# Patient Record
Sex: MALE | Race: WHITE | NOT HISPANIC OR LATINO | Employment: OTHER | URBAN - METROPOLITAN AREA
[De-identification: names, ages, dates, MRNs, and addresses within clinical notes are randomized per-mention and may not be internally consistent; named-entity substitution may affect disease eponyms.]

---

## 2023-05-05 PROBLEM — J44.9 COPD, MODERATE (MULTI): Status: ACTIVE | Noted: 2023-05-05

## 2023-05-05 PROBLEM — K21.9 GERD (GASTROESOPHAGEAL REFLUX DISEASE): Status: ACTIVE | Noted: 2023-05-05

## 2023-05-05 PROBLEM — E16.2 HYPOGLYCEMIA: Status: ACTIVE | Noted: 2023-05-05

## 2023-05-05 PROBLEM — N40.0 HYPERTROPHY OF PROSTATE: Status: ACTIVE | Noted: 2023-05-05

## 2023-05-05 PROBLEM — E83.42 HYPOMAGNESEMIA: Status: ACTIVE | Noted: 2023-05-05

## 2023-05-05 PROBLEM — R73.03 PREDIABETES: Status: ACTIVE | Noted: 2023-05-05

## 2023-05-05 PROBLEM — F17.200 TOBACCO USE DISORDER: Status: ACTIVE | Noted: 2023-05-05

## 2023-05-05 PROBLEM — M15.9 PRIMARY OSTEOARTHRITIS INVOLVING MULTIPLE JOINTS: Status: ACTIVE | Noted: 2023-05-05

## 2023-05-05 PROBLEM — D53.9 ANEMIA, DEFICIENCY: Status: ACTIVE | Noted: 2023-05-05

## 2023-05-05 PROBLEM — I10 ESSENTIAL HYPERTENSION: Status: ACTIVE | Noted: 2023-05-05

## 2023-05-05 PROBLEM — R73.9 HYPERGLYCEMIA: Status: ACTIVE | Noted: 2023-05-05

## 2023-05-05 PROBLEM — M15.0 PRIMARY OSTEOARTHRITIS INVOLVING MULTIPLE JOINTS: Status: ACTIVE | Noted: 2023-05-05

## 2023-05-05 RX ORDER — LANOLIN ALCOHOL/MO/W.PET/CERES
1 CREAM (GRAM) TOPICAL DAILY
COMMUNITY
Start: 2020-03-19 | End: 2023-06-01 | Stop reason: SDUPTHER

## 2023-05-05 RX ORDER — PANTOPRAZOLE SODIUM 40 MG/1
1 TABLET, DELAYED RELEASE ORAL DAILY
COMMUNITY
End: 2023-06-01 | Stop reason: SDUPTHER

## 2023-05-05 RX ORDER — AMLODIPINE BESYLATE 5 MG/1
1 TABLET ORAL DAILY
COMMUNITY
End: 2023-06-01 | Stop reason: SDUPTHER

## 2023-05-05 RX ORDER — ALBUTEROL SULFATE 0.83 MG/ML
SOLUTION RESPIRATORY (INHALATION)
COMMUNITY
Start: 2020-12-15 | End: 2023-12-05 | Stop reason: SDUPTHER

## 2023-05-05 RX ORDER — GLUC/MSM/COLGN2/HYAL/ANTIARTH3 375-375-20
1 TABLET ORAL EVERY OTHER DAY
COMMUNITY

## 2023-05-05 RX ORDER — ALBUTEROL SULFATE 90 UG/1
AEROSOL, METERED RESPIRATORY (INHALATION)
COMMUNITY
Start: 2019-11-04 | End: 2023-08-10

## 2023-05-05 RX ORDER — TAMSULOSIN HYDROCHLORIDE 0.4 MG/1
CAPSULE ORAL
COMMUNITY
End: 2023-06-01 | Stop reason: SDUPTHER

## 2023-05-22 LAB
ALANINE AMINOTRANSFERASE (SGPT) (U/L) IN SER/PLAS: 8 U/L (ref 10–52)
ALBUMIN (G/DL) IN SER/PLAS: 4.3 G/DL (ref 3.4–5)
ALKALINE PHOSPHATASE (U/L) IN SER/PLAS: 70 U/L (ref 33–136)
ANION GAP IN SER/PLAS: 12 MMOL/L (ref 10–20)
ASPARTATE AMINOTRANSFERASE (SGOT) (U/L) IN SER/PLAS: 17 U/L (ref 9–39)
BILIRUBIN TOTAL (MG/DL) IN SER/PLAS: 0.6 MG/DL (ref 0–1.2)
CALCIUM (MG/DL) IN SER/PLAS: 9.4 MG/DL (ref 8.6–10.3)
CARBON DIOXIDE, TOTAL (MMOL/L) IN SER/PLAS: 31 MMOL/L (ref 21–32)
CHLORIDE (MMOL/L) IN SER/PLAS: 100 MMOL/L (ref 98–107)
COBALAMIN (VITAMIN B12) (PG/ML) IN SER/PLAS: 322 PG/ML (ref 211–911)
CREATININE (MG/DL) IN SER/PLAS: 0.79 MG/DL (ref 0.5–1.3)
ERYTHROCYTE DISTRIBUTION WIDTH (RATIO) BY AUTOMATED COUNT: 14.1 % (ref 11.5–14.5)
ERYTHROCYTE MEAN CORPUSCULAR HEMOGLOBIN CONCENTRATION (G/DL) BY AUTOMATED: 32.1 G/DL (ref 32–36)
ERYTHROCYTE MEAN CORPUSCULAR VOLUME (FL) BY AUTOMATED COUNT: 99 FL (ref 80–100)
ERYTHROCYTES (10*6/UL) IN BLOOD BY AUTOMATED COUNT: 4.36 X10E12/L (ref 4.5–5.9)
GFR MALE: >90 ML/MIN/1.73M2
GLUCOSE (MG/DL) IN SER/PLAS: 105 MG/DL (ref 74–99)
HEMATOCRIT (%) IN BLOOD BY AUTOMATED COUNT: 43.3 % (ref 41–52)
HEMOGLOBIN (G/DL) IN BLOOD: 13.9 G/DL (ref 13.5–17.5)
IRON (UG/DL) IN SER/PLAS: 164 UG/DL (ref 35–150)
LEUKOCYTES (10*3/UL) IN BLOOD BY AUTOMATED COUNT: 8.1 X10E9/L (ref 4.4–11.3)
PLATELETS (10*3/UL) IN BLOOD AUTOMATED COUNT: 302 X10E9/L (ref 150–450)
POTASSIUM (MMOL/L) IN SER/PLAS: 4.2 MMOL/L (ref 3.5–5.3)
PROSTATE SPECIFIC ANTIGEN,SCREEN: 1.4 NG/ML (ref 0–4)
PROTEIN TOTAL: 7.1 G/DL (ref 6.4–8.2)
SODIUM (MMOL/L) IN SER/PLAS: 139 MMOL/L (ref 136–145)
UREA NITROGEN (MG/DL) IN SER/PLAS: 14 MG/DL (ref 6–23)

## 2023-06-01 ENCOUNTER — OFFICE VISIT (OUTPATIENT)
Dept: PRIMARY CARE | Facility: CLINIC | Age: 73
End: 2023-06-01
Payer: MEDICARE

## 2023-06-01 VITALS
RESPIRATION RATE: 16 BRPM | HEART RATE: 92 BPM | TEMPERATURE: 97.5 F | HEIGHT: 72 IN | SYSTOLIC BLOOD PRESSURE: 122 MMHG | DIASTOLIC BLOOD PRESSURE: 82 MMHG | OXYGEN SATURATION: 92 % | BODY MASS INDEX: 17.2 KG/M2 | WEIGHT: 127 LBS

## 2023-06-01 DIAGNOSIS — Z00.00 ROUTINE GENERAL MEDICAL EXAMINATION AT HEALTH CARE FACILITY: Primary | ICD-10-CM

## 2023-06-01 DIAGNOSIS — I10 ESSENTIAL HYPERTENSION: ICD-10-CM

## 2023-06-01 DIAGNOSIS — E83.42 HYPOMAGNESEMIA: ICD-10-CM

## 2023-06-01 DIAGNOSIS — N40.0 HYPERTROPHY OF PROSTATE: ICD-10-CM

## 2023-06-01 DIAGNOSIS — E46 PROTEIN-CALORIE MALNUTRITION, UNSPECIFIED SEVERITY (MULTI): ICD-10-CM

## 2023-06-01 DIAGNOSIS — J44.9 COPD, MODERATE (MULTI): ICD-10-CM

## 2023-06-01 DIAGNOSIS — D53.9 ANEMIA, DEFICIENCY: ICD-10-CM

## 2023-06-01 DIAGNOSIS — K21.9 GASTROESOPHAGEAL REFLUX DISEASE WITHOUT ESOPHAGITIS: ICD-10-CM

## 2023-06-01 PROCEDURE — 99213 OFFICE O/P EST LOW 20 MIN: CPT | Performed by: FAMILY MEDICINE

## 2023-06-01 PROCEDURE — G0439 PPPS, SUBSEQ VISIT: HCPCS | Performed by: FAMILY MEDICINE

## 2023-06-01 PROCEDURE — 1159F MED LIST DOCD IN RCRD: CPT | Performed by: FAMILY MEDICINE

## 2023-06-01 PROCEDURE — 3074F SYST BP LT 130 MM HG: CPT | Performed by: FAMILY MEDICINE

## 2023-06-01 PROCEDURE — 1170F FXNL STATUS ASSESSED: CPT | Performed by: FAMILY MEDICINE

## 2023-06-01 PROCEDURE — 1160F RVW MEDS BY RX/DR IN RCRD: CPT | Performed by: FAMILY MEDICINE

## 2023-06-01 PROCEDURE — 3079F DIAST BP 80-89 MM HG: CPT | Performed by: FAMILY MEDICINE

## 2023-06-01 RX ORDER — AMLODIPINE BESYLATE 5 MG/1
5 TABLET ORAL DAILY
Qty: 90 TABLET | Refills: 3 | Status: SHIPPED | OUTPATIENT
Start: 2023-06-01 | End: 2023-12-05 | Stop reason: SDUPTHER

## 2023-06-01 RX ORDER — LANOLIN ALCOHOL/MO/W.PET/CERES
1 CREAM (GRAM) TOPICAL DAILY
Qty: 90 TABLET | Refills: 3 | Status: SHIPPED | OUTPATIENT
Start: 2023-06-01 | End: 2024-05-31

## 2023-06-01 RX ORDER — TAMSULOSIN HYDROCHLORIDE 0.4 MG/1
0.4 CAPSULE ORAL DAILY
Qty: 90 CAPSULE | Refills: 3 | Status: SHIPPED | OUTPATIENT
Start: 2023-06-01 | End: 2023-12-05 | Stop reason: SDUPTHER

## 2023-06-01 RX ORDER — PANTOPRAZOLE SODIUM 40 MG/1
40 TABLET, DELAYED RELEASE ORAL DAILY
Qty: 90 TABLET | Refills: 3 | Status: SHIPPED | OUTPATIENT
Start: 2023-06-01 | End: 2023-12-05 | Stop reason: SDUPTHER

## 2023-06-01 RX ORDER — CYPROHEPTADINE HYDROCHLORIDE 4 MG/1
4 TABLET ORAL 2 TIMES DAILY PRN
COMMUNITY
Start: 2023-05-25 | End: 2023-12-05 | Stop reason: WASHOUT

## 2023-06-01 SDOH — ECONOMIC STABILITY: FOOD INSECURITY: WITHIN THE PAST 12 MONTHS, THE FOOD YOU BOUGHT JUST DIDN'T LAST AND YOU DIDN'T HAVE MONEY TO GET MORE.: NEVER TRUE

## 2023-06-01 SDOH — ECONOMIC STABILITY: FOOD INSECURITY: WITHIN THE PAST 12 MONTHS, YOU WORRIED THAT YOUR FOOD WOULD RUN OUT BEFORE YOU GOT MONEY TO BUY MORE.: NEVER TRUE

## 2023-06-01 ASSESSMENT — ACTIVITIES OF DAILY LIVING (ADL)
DOING_HOUSEWORK: INDEPENDENT
TAKING_MEDICATION: INDEPENDENT
BATHING: INDEPENDENT
MANAGING_FINANCES: INDEPENDENT
GROCERY_SHOPPING: INDEPENDENT
DRESSING: INDEPENDENT

## 2023-06-01 ASSESSMENT — PATIENT HEALTH QUESTIONNAIRE - PHQ9
SUM OF ALL RESPONSES TO PHQ9 QUESTIONS 1 & 2: 0
2. FEELING DOWN, DEPRESSED OR HOPELESS: NOT AT ALL
1. LITTLE INTEREST OR PLEASURE IN DOING THINGS: NOT AT ALL

## 2023-06-01 ASSESSMENT — ENCOUNTER SYMPTOMS
OCCASIONAL FEELINGS OF UNSTEADINESS: 0
LOSS OF SENSATION IN FEET: 0
DEPRESSION: 0

## 2023-06-01 ASSESSMENT — LIFESTYLE VARIABLES
SKIP TO QUESTIONS 9-10: 1
AUDIT-C TOTAL SCORE: 4
HOW OFTEN DO YOU HAVE SIX OR MORE DRINKS ON ONE OCCASION: NEVER
HOW OFTEN DO YOU HAVE A DRINK CONTAINING ALCOHOL: 4 OR MORE TIMES A WEEK
HOW MANY STANDARD DRINKS CONTAINING ALCOHOL DO YOU HAVE ON A TYPICAL DAY: 1 OR 2

## 2023-06-01 NOTE — PROGRESS NOTES
Pt independent from home with spouse (who is an RN). Will need PICC /IV zosyn at discharge. [facility] referral/accepted; pt in agreement. [place] [id number] orders ( For pharmacy only, one time visit)on chart. Will need IV atb RX faxed to [facility] at [phone]. await final ID plan. He Duque. Addendum; per leticia at [facility]; pt's wife is an employee of [employer]; [facility] out of network; must refer to [facility] (DONE) start of care can begin at discharge per anna at [place] [id number]. . Will follow. Pt aware. He Duque. Subjective   Patient ID: Carlito Vail is a 72 y.o. male who presents for Medicare Annual Wellness Visit Subsequent.  At his last visit, I started him on cyproheptadine to try to help increase his appetite and help with allergies. He has noticed an improvement in his appetite and is up 20 lbs in past 6 months. He does not feel that it is helping with his allergy symptoms but isn't too concerned about it.         Current Outpatient Medications   Medication Sig Dispense Refill    albuterol 2.5 mg /3 mL (0.083 %) nebulizer solution Inhale.      albuterol 90 mcg/actuation inhaler Inhale.      cyproheptadine (Periactin) 4 mg tablet Take 1 tablet (4 mg) by mouth 2 times a day as needed for allergies.      ferrous gluconate 236 mg (27 mg iron) tablet Take 1 tablet (27 mg) by mouth every other day.      amLODIPine (Norvasc) 5 mg tablet Take 1 tablet (5 mg) by mouth once daily. 90 tablet 3    magnesium oxide (Mag-Ox) 400 mg (241.3 mg magnesium) tablet Take 1 tablet (400 mg) by mouth once daily. 90 tablet 3    pantoprazole (ProtoNix) 40 mg EC tablet Take 1 tablet (40 mg) by mouth once daily. 90 tablet 3    tamsulosin (Flomax) 0.4 mg 24 hr capsule Take 1 capsule (0.4 mg) by mouth once daily. 90 capsule 3     No current facility-administered medications for this visit.       Objective     Visit Vitals  /82 (BP Location: Left arm, Patient Position: Sitting, BP Cuff Size: Adult)   Pulse 92   Temp 36.4 °C (97.5 °F)   Resp 16   Ht 1.829 m (6')   Wt 57.6 kg (127 lb)   SpO2 92%   BMI 17.22 kg/m²   Smoking Status Every Day   BSA 1.71 m²        Constitutional: thin, well developed, appears stated age  Eyes: no scleral icterus, no conjunctival injection  Ears: normal external auditory canal, no retraction or bulging of tympanic membranes  Neck: no thyromegaly  Cardiovascular: regular rate and rhythm, no leg edema  Respiratory: normal respiratory effort, clear to auscultation bilaterally  Musculoskeletal: No gross deformities  appreciated  Skin: Warm, dry. No rashes  Neurologic: Alert, CNs II-XII grossly intact..  Psych: Appropriate mood and affect.        Assessment/Plan   Problem List Items Addressed This Visit          Respiratory    COPD, moderate (CMS/HCC)     Stable on albuterol prn            Circulatory    Essential hypertension     Controlled on amlodipine          Relevant Medications    amLODIPine (Norvasc) 5 mg tablet    Other Relevant Orders    Comprehensive metabolic panel       Digestive    GERD (gastroesophageal reflux disease)    Relevant Medications    pantoprazole (ProtoNix) 40 mg EC tablet       Genitourinary    Hypertrophy of prostate    Relevant Medications    tamsulosin (Flomax) 0.4 mg 24 hr capsule       Endocrine/Metabolic    Protein-calorie malnutrition, unspecified severity (CMS/HCC)     Weight has improved by 20 lbs in past 6 months since starting cyproheptadine, continue            Hematologic    Anemia, deficiency     Iron level is now normal (actually slightly high)  Decrease ferrous gluconate to every other day         Relevant Orders    CBC    Iron level       Other    Hypomagnesemia    Relevant Medications    magnesium oxide (Mag-Ox) 400 mg (241.3 mg magnesium) tablet     Other Visit Diagnoses       Routine general medical examination at health care facility    -  Primary    Colonoscopy 5/17/2021              All pertinent lab work and results were reviewed with patient.     Follow up 6 month     Kirsten Jay DO

## 2023-08-10 DIAGNOSIS — J44.9 COPD, MODERATE (MULTI): Primary | ICD-10-CM

## 2023-08-10 RX ORDER — ALBUTEROL SULFATE 90 UG/1
2 AEROSOL, METERED RESPIRATORY (INHALATION) EVERY 4 HOURS PRN
Qty: 18 G | Refills: 11 | Status: SHIPPED | OUTPATIENT
Start: 2023-08-10 | End: 2023-12-05 | Stop reason: SDUPTHER

## 2023-08-21 ENCOUNTER — HOSPITAL ENCOUNTER (OUTPATIENT)
Dept: DATA CONVERSION | Facility: HOSPITAL | Age: 73
End: 2023-08-21
Attending: SURGERY | Admitting: SURGERY
Payer: MEDICARE

## 2023-08-21 DIAGNOSIS — Z80.0 FAMILY HISTORY OF MALIGNANT NEOPLASM OF DIGESTIVE ORGANS: ICD-10-CM

## 2023-08-21 DIAGNOSIS — Q43.8 OTHER SPECIFIED CONGENITAL MALFORMATIONS OF INTESTINE: ICD-10-CM

## 2023-08-21 DIAGNOSIS — Z12.11 ENCOUNTER FOR SCREENING FOR MALIGNANT NEOPLASM OF COLON: ICD-10-CM

## 2023-08-21 DIAGNOSIS — Z86.010 PERSONAL HISTORY OF COLONIC POLYPS: ICD-10-CM

## 2023-09-29 VITALS — BODY MASS INDEX: 15.1 KG/M2 | WEIGHT: 111.33 LBS

## 2023-11-21 ENCOUNTER — LAB (OUTPATIENT)
Dept: LAB | Facility: LAB | Age: 73
End: 2023-11-21
Payer: MEDICARE

## 2023-11-21 DIAGNOSIS — D53.9 ANEMIA, DEFICIENCY: ICD-10-CM

## 2023-11-21 DIAGNOSIS — I10 ESSENTIAL HYPERTENSION: ICD-10-CM

## 2023-11-21 LAB
ALBUMIN SERPL BCP-MCNC: 4.2 G/DL (ref 3.4–5)
ALP SERPL-CCNC: 81 U/L (ref 33–136)
ALT SERPL W P-5'-P-CCNC: 7 U/L (ref 10–52)
ANION GAP SERPL CALC-SCNC: 11 MMOL/L (ref 10–20)
AST SERPL W P-5'-P-CCNC: 14 U/L (ref 9–39)
BILIRUB SERPL-MCNC: 0.4 MG/DL (ref 0–1.2)
BUN SERPL-MCNC: 14 MG/DL (ref 6–23)
CALCIUM SERPL-MCNC: 9.1 MG/DL (ref 8.6–10.3)
CHLORIDE SERPL-SCNC: 100 MMOL/L (ref 98–107)
CO2 SERPL-SCNC: 30 MMOL/L (ref 21–32)
CREAT SERPL-MCNC: 0.8 MG/DL (ref 0.5–1.3)
ERYTHROCYTE [DISTWIDTH] IN BLOOD BY AUTOMATED COUNT: 13.3 % (ref 11.5–14.5)
GFR SERPL CREATININE-BSD FRML MDRD: >90 ML/MIN/1.73M*2
GLUCOSE SERPL-MCNC: 149 MG/DL (ref 74–99)
HCT VFR BLD AUTO: 43.2 % (ref 41–52)
HGB BLD-MCNC: 14 G/DL (ref 13.5–17.5)
IRON SERPL-MCNC: 124 UG/DL (ref 35–150)
MCH RBC QN AUTO: 32.6 PG (ref 26–34)
MCHC RBC AUTO-ENTMCNC: 32.4 G/DL (ref 32–36)
MCV RBC AUTO: 101 FL (ref 80–100)
NRBC BLD-RTO: 0 /100 WBCS (ref 0–0)
PLATELET # BLD AUTO: 282 X10*3/UL (ref 150–450)
POTASSIUM SERPL-SCNC: 3.4 MMOL/L (ref 3.5–5.3)
PROT SERPL-MCNC: 6.6 G/DL (ref 6.4–8.2)
RBC # BLD AUTO: 4.29 X10*6/UL (ref 4.5–5.9)
SODIUM SERPL-SCNC: 138 MMOL/L (ref 136–145)
WBC # BLD AUTO: 6.7 X10*3/UL (ref 4.4–11.3)

## 2023-11-21 PROCEDURE — 36415 COLL VENOUS BLD VENIPUNCTURE: CPT

## 2023-11-21 PROCEDURE — 85027 COMPLETE CBC AUTOMATED: CPT

## 2023-11-21 PROCEDURE — 80053 COMPREHEN METABOLIC PANEL: CPT

## 2023-11-21 PROCEDURE — 83540 ASSAY OF IRON: CPT

## 2023-12-04 NOTE — PATIENT INSTRUCTIONS
I ordered blood work for your next visit. You do not need to fast.     Thank you for choosing [facility] for your healthcare.   As always if you have any questions or concerns please do not hesitate to call our office at [phone] or through [employer].    Have a great day!  Kirsten Jay, DO

## 2023-12-05 ENCOUNTER — OFFICE VISIT (OUTPATIENT)
Dept: PRIMARY CARE | Facility: CLINIC | Age: 73
End: 2023-12-05
Payer: MEDICARE

## 2023-12-05 VITALS
SYSTOLIC BLOOD PRESSURE: 137 MMHG | HEART RATE: 68 BPM | OXYGEN SATURATION: 99 % | TEMPERATURE: 97.1 F | BODY MASS INDEX: 15.33 KG/M2 | DIASTOLIC BLOOD PRESSURE: 82 MMHG | WEIGHT: 113 LBS | RESPIRATION RATE: 16 BRPM

## 2023-12-05 DIAGNOSIS — R73.9 HYPERGLYCEMIA: ICD-10-CM

## 2023-12-05 DIAGNOSIS — N40.0 HYPERTROPHY OF PROSTATE: ICD-10-CM

## 2023-12-05 DIAGNOSIS — D53.9 ANEMIA, DEFICIENCY: ICD-10-CM

## 2023-12-05 DIAGNOSIS — I10 ESSENTIAL HYPERTENSION: ICD-10-CM

## 2023-12-05 DIAGNOSIS — K21.9 GASTROESOPHAGEAL REFLUX DISEASE WITHOUT ESOPHAGITIS: ICD-10-CM

## 2023-12-05 DIAGNOSIS — J44.9 COPD, MODERATE (MULTI): Primary | ICD-10-CM

## 2023-12-05 PROCEDURE — 1126F AMNT PAIN NOTED NONE PRSNT: CPT | Performed by: FAMILY MEDICINE

## 2023-12-05 PROCEDURE — 3075F SYST BP GE 130 - 139MM HG: CPT | Performed by: FAMILY MEDICINE

## 2023-12-05 PROCEDURE — 1160F RVW MEDS BY RX/DR IN RCRD: CPT | Performed by: FAMILY MEDICINE

## 2023-12-05 PROCEDURE — 1159F MED LIST DOCD IN RCRD: CPT | Performed by: FAMILY MEDICINE

## 2023-12-05 PROCEDURE — 99214 OFFICE O/P EST MOD 30 MIN: CPT | Performed by: FAMILY MEDICINE

## 2023-12-05 PROCEDURE — 3079F DIAST BP 80-89 MM HG: CPT | Performed by: FAMILY MEDICINE

## 2023-12-05 RX ORDER — FLUTICASONE FUROATE, UMECLIDINIUM BROMIDE AND VILANTEROL TRIFENATATE 100; 62.5; 25 UG/1; UG/1; UG/1
1 POWDER RESPIRATORY (INHALATION) DAILY
Qty: 60 EACH | Refills: 11 | Status: SHIPPED | OUTPATIENT
Start: 2023-12-05

## 2023-12-05 RX ORDER — ALBUTEROL SULFATE 0.83 MG/ML
2.5 SOLUTION RESPIRATORY (INHALATION) EVERY 6 HOURS PRN
Qty: 75 ML | Refills: 11 | Status: SHIPPED | OUTPATIENT
Start: 2023-12-05

## 2023-12-05 RX ORDER — TAMSULOSIN HYDROCHLORIDE 0.4 MG/1
0.4 CAPSULE ORAL DAILY
Qty: 90 CAPSULE | Refills: 3 | Status: SHIPPED | OUTPATIENT
Start: 2023-12-05 | End: 2024-12-04

## 2023-12-05 RX ORDER — ALBUTEROL SULFATE 90 UG/1
2 AEROSOL, METERED RESPIRATORY (INHALATION) EVERY 4 HOURS PRN
Qty: 18 G | Refills: 11 | Status: SHIPPED | OUTPATIENT
Start: 2023-12-05 | End: 2024-06-10

## 2023-12-05 RX ORDER — AMLODIPINE BESYLATE 5 MG/1
5 TABLET ORAL DAILY
Qty: 90 TABLET | Refills: 3 | Status: SHIPPED | OUTPATIENT
Start: 2023-12-05 | End: 2024-12-04

## 2023-12-05 RX ORDER — ALBUTEROL SULFATE 0.83 MG/ML
2.5 SOLUTION RESPIRATORY (INHALATION) EVERY 4 HOURS PRN
Qty: 75 ML | Refills: 1 | Status: CANCELLED | OUTPATIENT
Start: 2023-12-05

## 2023-12-05 RX ORDER — PANTOPRAZOLE SODIUM 40 MG/1
40 TABLET, DELAYED RELEASE ORAL DAILY
Qty: 90 TABLET | Refills: 3 | Status: SHIPPED | OUTPATIENT
Start: 2023-12-05 | End: 2024-06-10

## 2023-12-05 SDOH — ECONOMIC STABILITY: FOOD INSECURITY: WITHIN THE PAST 12 MONTHS, THE FOOD YOU BOUGHT JUST DIDN'T LAST AND YOU DIDN'T HAVE MONEY TO GET MORE.: NEVER TRUE

## 2023-12-05 SDOH — ECONOMIC STABILITY: FOOD INSECURITY: WITHIN THE PAST 12 MONTHS, YOU WORRIED THAT YOUR FOOD WOULD RUN OUT BEFORE YOU GOT MONEY TO BUY MORE.: NEVER TRUE

## 2023-12-05 ASSESSMENT — LIFESTYLE VARIABLES
HOW OFTEN DO YOU HAVE A DRINK CONTAINING ALCOHOL: MONTHLY OR LESS
HOW OFTEN DO YOU HAVE SIX OR MORE DRINKS ON ONE OCCASION: NEVER
HOW MANY STANDARD DRINKS CONTAINING ALCOHOL DO YOU HAVE ON A TYPICAL DAY: 1 OR 2
AUDIT-C TOTAL SCORE: 1
SKIP TO QUESTIONS 9-10: 1

## 2023-12-05 ASSESSMENT — PATIENT HEALTH QUESTIONNAIRE - PHQ9
1. LITTLE INTEREST OR PLEASURE IN DOING THINGS: NOT AT ALL
SUM OF ALL RESPONSES TO PHQ9 QUESTIONS 1 & 2: 0
2. FEELING DOWN, DEPRESSED OR HOPELESS: NOT AT ALL

## 2023-12-05 ASSESSMENT — PAIN SCALES - GENERAL: PAINLEVEL: 0-NO PAIN

## 2023-12-05 NOTE — PROGRESS NOTES
"Subjective   Patient ID: Verner L Blankenship \"Navjot" is a 73 y.o. male who presents for Follow-up, Anemia, Hypertension, and COPD.  HPI    No new problems or concerns. His pharmacist recommended he ask me about maintenance medication for COPD due to he is refilling his albuterol monthly and using it almost daily. He recalls being on Anoro in the past which worked well until it became too expensive.     Current Outpatient Medications   Medication Sig Dispense Refill    ferrous gluconate 236 mg (27 mg iron) tablet Take 1 tablet (27 mg) by mouth every other day.      magnesium oxide (Mag-Ox) 400 mg (241.3 mg magnesium) tablet Take 1 tablet (400 mg) by mouth once daily. 90 tablet 3    albuterol 2.5 mg /3 mL (0.083 %) nebulizer solution Take 3 mL (2.5 mg) by nebulization every 6 hours if needed for wheezing. 75 mL 11    albuterol 90 mcg/actuation inhaler Inhale 2 puffs every 4 hours if needed for shortness of breath. 18 g 11    amLODIPine (Norvasc) 5 mg tablet Take 1 tablet (5 mg) by mouth once daily. 90 tablet 3    fluticasone-umeclidin-vilanter (Trelegy Ellipta) 100-62.5-25 mcg blister with device Inhale 1 puff once daily. 60 each 11    pantoprazole (ProtoNix) 40 mg EC tablet Take 1 tablet (40 mg) by mouth once daily. 90 tablet 3    tamsulosin (Flomax) 0.4 mg 24 hr capsule Take 1 capsule (0.4 mg) by mouth once daily. 90 capsule 3     No current facility-administered medications for this visit.       Objective     Visit Vitals  /82 (BP Location: Left arm, Patient Position: Sitting, BP Cuff Size: Adult)   Pulse 68   Temp 36.2 °C (97.1 °F) (Temporal)   Resp 16   Wt 51.3 kg (113 lb)   SpO2 99%   BMI 15.33 kg/m²   Smoking Status Every Day   BSA 1.61 m²        Constitutional: thin, well developed, appears stated age  Eyes: no scleral icterus, no conjunctival injection  Ears: normal external auditory canal, no retraction or bulging of tympanic membranes  Neck: no thyromegaly  Cardiovascular: regular rate and rhythm, no " leg edema  Respiratory: normal respiratory effort, clear to auscultation bilaterally  Musculoskeletal: No gross deformities appreciated  Skin: Warm, dry. No rashes  Neurologic: Alert, CNs II-XII grossly intact..  Psych: Appropriate mood and affect.        Assessment/Plan   Problem List Items Addressed This Visit       Anemia, deficiency (Chronic)     Continue iron every other day         Relevant Orders    Folate    CBC    Iron level    COPD, moderate (CMS/HCC) - Primary (Chronic)     Using albtuerol often  Will have him try Trelegy         Relevant Medications    albuterol 90 mcg/actuation inhaler    albuterol 2.5 mg /3 mL (0.083 %) nebulizer solution    fluticasone-umeclidin-vilanter (Trelegy Ellipta) 100-62.5-25 mcg blister with device    Essential hypertension    Relevant Medications    amLODIPine (Norvasc) 5 mg tablet    Other Relevant Orders    Comprehensive metabolic panel    GERD (gastroesophageal reflux disease)    Relevant Medications    pantoprazole (ProtoNix) 40 mg EC tablet    Hyperglycemia    Relevant Orders    Hemoglobin A1C    Hypertrophy of prostate    Relevant Medications    tamsulosin (Flomax) 0.4 mg 24 hr capsule    Other Relevant Orders    Magnesium       All pertinent lab work and results were reviewed with patient.     Follow up 6 months.    Kirsten Jay DO

## 2024-06-05 NOTE — PROGRESS NOTES
"Subjective   Patient ID: Verner L Blankenship \"Navjot" is a 73 y.o. male who presents for Medicare Annual Wellness Visit Subsequent (Follow up).  No new problems or concerns.    At his last visit, I started him on Trelegy. He sometimes forgets to use it but for the most part, feels that it has helped with his breathing.         In addition to that documented in the HPI above, the additional ROS was obtained:  Constitutional: Denies fevers or chills  Eyes: Denies vision changes  ENMT: Denies trouble swallowing  Cardiovascular: Denies chest pain or heart racing  Respiratory: Denies SOB or cough      Patient Care Team:  Kirsten Jay DO as PCP - General  Kirsten Jay DO as PCP - [employer] PCP    Current Outpatient Medications   Medication Sig Dispense Refill    albuterol 2.5 mg /3 mL (0.083 %) nebulizer solution Take 3 mL (2.5 mg) by nebulization every 6 hours if needed for wheezing. 75 mL 11    albuterol 90 mcg/actuation inhaler Inhale 2 puffs every 4 hours if needed for shortness of breath. 18 g 11    amLODIPine (Norvasc) 5 mg tablet Take 1 tablet (5 mg) by mouth once daily. 90 tablet 3    ferrous gluconate 236 mg (27 mg iron) tablet Take 1 tablet (27 mg) by mouth every other day.      fluticasone-umeclidin-vilanter (Trelegy Ellipta) 100-62.5-25 mcg blister with device Inhale 1 puff once daily. 60 each 11    pantoprazole (ProtoNix) 40 mg EC tablet Take 1 tablet (40 mg) by mouth once daily. 90 tablet 3    tamsulosin (Flomax) 0.4 mg 24 hr capsule Take 1 capsule (0.4 mg) by mouth once daily. 90 capsule 3     No current facility-administered medications for this visit.       Objective     Visit Vitals  /78 (BP Location: Right arm, Patient Position: Sitting)   Pulse 90   Temp 36.4 °C (97.5 °F) (Temporal)   Resp 15   Ht 1.829 m (6')   Wt 54.4 kg (120 lb)   SpO2 98%   BMI 16.27 kg/m²   Smoking Status Every Day   BSA 1.66 m²        Constitutional: thin, well developed, appears stated age  Eyes: " no scleral icterus, no conjunctival injection  Ears: normal external auditory canal, no retraction or bulging of tympanic membranes  Neck: no thyromegaly  Cardiovascular: regular rate and rhythm, no leg edema  Respiratory: normal respiratory effort, clear to auscultation bilaterally  Musculoskeletal: increased thoracic kyphosis   Skin: Warm, dry. No rashes  Neurologic: Alert, CNs II-XII grossly intact..  Psych: Appropriate mood and affect.        Assessment/Plan   Problem List Items Addressed This Visit       COPD, moderate (Multi) (Chronic)     Using albtuerol often  Will have him try Trelegy         Protein calorie malnutrition (Multi) (Chronic)     Weight is up 7 lbs in the past 6 months          Other Visit Diagnoses       Routine general medical examination at health care facility    -  Primary    [id number] given today  labs ordered    Need for pneumococcal 20-valent conjugate vaccination        Relevant Orders    Pneumococcal conjugate vaccine, 20-valent (PREVNAR 20) (Completed)          Follow up 6 months.    Kirsten Jay, DO

## 2024-06-05 NOTE — PATIENT INSTRUCTIONS
Thank you for choosing [facility] for your healthcare.   As always if you have any questions or concerns please do not hesitate to call our office at [phone] or through [employer].    Have a great day!  Kirsten Jay, DO

## 2024-06-06 ENCOUNTER — OFFICE VISIT (OUTPATIENT)
Dept: PRIMARY CARE | Facility: CLINIC | Age: 74
End: 2024-06-06
Payer: MEDICARE

## 2024-06-06 ENCOUNTER — LAB (OUTPATIENT)
Dept: LAB | Facility: LAB | Age: 74
End: 2024-06-06
Payer: MEDICARE

## 2024-06-06 VITALS
BODY MASS INDEX: 16.25 KG/M2 | HEIGHT: 72 IN | WEIGHT: 120 LBS | DIASTOLIC BLOOD PRESSURE: 78 MMHG | RESPIRATION RATE: 15 BRPM | SYSTOLIC BLOOD PRESSURE: 140 MMHG | TEMPERATURE: 97.5 F | HEART RATE: 90 BPM | OXYGEN SATURATION: 98 %

## 2024-06-06 DIAGNOSIS — J44.9 COPD, MODERATE (MULTI): Chronic | ICD-10-CM

## 2024-06-06 DIAGNOSIS — R73.9 HYPERGLYCEMIA: ICD-10-CM

## 2024-06-06 DIAGNOSIS — Z23 NEED FOR PNEUMOCOCCAL 20-VALENT CONJUGATE VACCINATION: ICD-10-CM

## 2024-06-06 DIAGNOSIS — N40.0 HYPERTROPHY OF PROSTATE: ICD-10-CM

## 2024-06-06 DIAGNOSIS — E44.0 MODERATE PROTEIN-CALORIE MALNUTRITION (MULTI): Chronic | ICD-10-CM

## 2024-06-06 DIAGNOSIS — I10 ESSENTIAL HYPERTENSION: ICD-10-CM

## 2024-06-06 DIAGNOSIS — Z00.00 ROUTINE GENERAL MEDICAL EXAMINATION AT HEALTH CARE FACILITY: Primary | ICD-10-CM

## 2024-06-06 DIAGNOSIS — D53.9 ANEMIA, DEFICIENCY: ICD-10-CM

## 2024-06-06 PROBLEM — E46 PROTEIN CALORIE MALNUTRITION (MULTI): Chronic | Status: ACTIVE | Noted: 2023-06-01

## 2024-06-06 LAB
ALBUMIN SERPL BCP-MCNC: 4.1 G/DL (ref 3.4–5)
ALP SERPL-CCNC: 80 U/L (ref 33–136)
ALT SERPL W P-5'-P-CCNC: 8 U/L (ref 10–52)
ANION GAP SERPL CALC-SCNC: 12 MMOL/L (ref 10–20)
AST SERPL W P-5'-P-CCNC: 14 U/L (ref 9–39)
BILIRUB SERPL-MCNC: 0.5 MG/DL (ref 0–1.2)
BUN SERPL-MCNC: 16 MG/DL (ref 6–23)
CALCIUM SERPL-MCNC: 9.3 MG/DL (ref 8.6–10.3)
CHLORIDE SERPL-SCNC: 104 MMOL/L (ref 98–107)
CO2 SERPL-SCNC: 28 MMOL/L (ref 21–32)
CREAT SERPL-MCNC: 0.79 MG/DL (ref 0.5–1.3)
EGFRCR SERPLBLD CKD-EPI 2021: >90 ML/MIN/1.73M*2
ERYTHROCYTE [DISTWIDTH] IN BLOOD BY AUTOMATED COUNT: 13.1 % (ref 11.5–14.5)
EST. AVERAGE GLUCOSE BLD GHB EST-MCNC: 120 MG/DL
FOLATE SERPL-MCNC: 12.9 NG/ML
GLUCOSE SERPL-MCNC: 100 MG/DL (ref 74–99)
HBA1C MFR BLD: 5.8 %
HCT VFR BLD AUTO: 44 % (ref 41–52)
HGB BLD-MCNC: 14.5 G/DL (ref 13.5–17.5)
IRON SERPL-MCNC: 174 UG/DL (ref 35–150)
MAGNESIUM SERPL-MCNC: 1.78 MG/DL (ref 1.6–2.4)
MCH RBC QN AUTO: 32.6 PG (ref 26–34)
MCHC RBC AUTO-ENTMCNC: 33 G/DL (ref 32–36)
MCV RBC AUTO: 99 FL (ref 80–100)
NRBC BLD-RTO: 0 /100 WBCS (ref 0–0)
PLATELET # BLD AUTO: 325 X10*3/UL (ref 150–450)
POTASSIUM SERPL-SCNC: 3.7 MMOL/L (ref 3.5–5.3)
PROT SERPL-MCNC: 7 G/DL (ref 6.4–8.2)
RBC # BLD AUTO: 4.45 X10*6/UL (ref 4.5–5.9)
SODIUM SERPL-SCNC: 140 MMOL/L (ref 136–145)
WBC # BLD AUTO: 8.4 X10*3/UL (ref 4.4–11.3)

## 2024-06-06 PROCEDURE — 1160F RVW MEDS BY RX/DR IN RCRD: CPT | Performed by: FAMILY MEDICINE

## 2024-06-06 PROCEDURE — 1159F MED LIST DOCD IN RCRD: CPT | Performed by: FAMILY MEDICINE

## 2024-06-06 PROCEDURE — 3077F SYST BP >= 140 MM HG: CPT | Performed by: FAMILY MEDICINE

## 2024-06-06 PROCEDURE — 1123F ACP DISCUSS/DSCN MKR DOCD: CPT | Performed by: FAMILY MEDICINE

## 2024-06-06 PROCEDURE — 83735 ASSAY OF MAGNESIUM: CPT

## 2024-06-06 PROCEDURE — G0439 PPPS, SUBSEQ VISIT: HCPCS | Performed by: FAMILY MEDICINE

## 2024-06-06 PROCEDURE — 90677 PCV20 VACCINE IM: CPT | Performed by: FAMILY MEDICINE

## 2024-06-06 PROCEDURE — 3078F DIAST BP <80 MM HG: CPT | Performed by: FAMILY MEDICINE

## 2024-06-06 PROCEDURE — 83540 ASSAY OF IRON: CPT

## 2024-06-06 PROCEDURE — 82746 ASSAY OF FOLIC ACID SERUM: CPT

## 2024-06-06 PROCEDURE — 83036 HEMOGLOBIN GLYCOSYLATED A1C: CPT

## 2024-06-06 PROCEDURE — 1170F FXNL STATUS ASSESSED: CPT | Performed by: FAMILY MEDICINE

## 2024-06-06 PROCEDURE — G0009 ADMIN PNEUMOCOCCAL VACCINE: HCPCS | Performed by: FAMILY MEDICINE

## 2024-06-06 PROCEDURE — 36415 COLL VENOUS BLD VENIPUNCTURE: CPT

## 2024-06-06 PROCEDURE — 80053 COMPREHEN METABOLIC PANEL: CPT

## 2024-06-06 PROCEDURE — 85027 COMPLETE CBC AUTOMATED: CPT

## 2024-06-06 PROCEDURE — 1126F AMNT PAIN NOTED NONE PRSNT: CPT | Performed by: FAMILY MEDICINE

## 2024-06-06 SDOH — ECONOMIC STABILITY: FOOD INSECURITY: WITHIN THE PAST 12 MONTHS, YOU WORRIED THAT YOUR FOOD WOULD RUN OUT BEFORE YOU GOT MONEY TO BUY MORE.: NEVER TRUE

## 2024-06-06 SDOH — ECONOMIC STABILITY: FOOD INSECURITY: WITHIN THE PAST 12 MONTHS, THE FOOD YOU BOUGHT JUST DIDN'T LAST AND YOU DIDN'T HAVE MONEY TO GET MORE.: NEVER TRUE

## 2024-06-06 ASSESSMENT — ACTIVITIES OF DAILY LIVING (ADL)
DOING_HOUSEWORK: INDEPENDENT
TAKING_MEDICATION: INDEPENDENT
BATHING: INDEPENDENT
MANAGING_FINANCES: INDEPENDENT
DRESSING: INDEPENDENT
GROCERY_SHOPPING: INDEPENDENT

## 2024-06-06 ASSESSMENT — ANXIETY QUESTIONNAIRES
1. FEELING NERVOUS, ANXIOUS, OR ON EDGE: NOT AT ALL
7. FEELING AFRAID AS IF SOMETHING AWFUL MIGHT HAPPEN: NOT AT ALL
6. BECOMING EASILY ANNOYED OR IRRITABLE: NOT AT ALL
IF YOU CHECKED OFF ANY PROBLEMS ON THIS QUESTIONNAIRE, HOW DIFFICULT HAVE THESE PROBLEMS MADE IT FOR YOU TO DO YOUR WORK, TAKE CARE OF THINGS AT HOME, OR GET ALONG WITH OTHER PEOPLE: NOT DIFFICULT AT ALL
2. NOT BEING ABLE TO STOP OR CONTROL WORRYING: NOT AT ALL
3. WORRYING TOO MUCH ABOUT DIFFERENT THINGS: NOT AT ALL
5. BEING SO RESTLESS THAT IT IS HARD TO SIT STILL: NOT AT ALL
GAD7 TOTAL SCORE: 0
4. TROUBLE RELAXING: NOT AT ALL

## 2024-06-06 ASSESSMENT — LIFESTYLE VARIABLES
AUDIT-C TOTAL SCORE: 0
HOW OFTEN DO YOU HAVE A DRINK CONTAINING ALCOHOL: NEVER
SKIP TO QUESTIONS 9-10: 1
HOW OFTEN DO YOU HAVE SIX OR MORE DRINKS ON ONE OCCASION: NEVER
HOW MANY STANDARD DRINKS CONTAINING ALCOHOL DO YOU HAVE ON A TYPICAL DAY: PATIENT DOES NOT DRINK

## 2024-06-06 ASSESSMENT — ENCOUNTER SYMPTOMS
DEPRESSION: 0
OCCASIONAL FEELINGS OF UNSTEADINESS: 0
LOSS OF SENSATION IN FEET: 0

## 2024-06-06 ASSESSMENT — PAIN SCALES - GENERAL: PAINLEVEL: 0-NO PAIN

## 2024-06-10 DIAGNOSIS — J44.9 COPD, MODERATE (MULTI): ICD-10-CM

## 2024-06-10 DIAGNOSIS — K21.9 GASTROESOPHAGEAL REFLUX DISEASE WITHOUT ESOPHAGITIS: ICD-10-CM

## 2024-06-10 RX ORDER — PANTOPRAZOLE SODIUM 40 MG/1
40 TABLET, DELAYED RELEASE ORAL DAILY
Qty: 90 TABLET | Refills: 3 | Status: SHIPPED | OUTPATIENT
Start: 2024-06-10

## 2024-06-10 RX ORDER — ALBUTEROL SULFATE 90 UG/1
2 AEROSOL, METERED RESPIRATORY (INHALATION) EVERY 4 HOURS PRN
Qty: 18 G | Refills: 3 | Status: SHIPPED | OUTPATIENT
Start: 2024-06-10

## 2024-07-18 ENCOUNTER — TELEPHONE (OUTPATIENT)
Dept: PRIMARY CARE | Facility: CLINIC | Age: 74
End: 2024-07-18
Payer: MEDICARE

## 2024-07-18 DIAGNOSIS — F17.200 TOBACCO USE DISORDER: ICD-10-CM

## 2024-07-18 DIAGNOSIS — J44.9 COPD, MODERATE (MULTI): Primary | Chronic | ICD-10-CM

## 2024-07-18 NOTE — TELEPHONE ENCOUNTER
A NP from [facility] with pt's insurance did a home visit and stated that pt is in an acute COPD exacerbation. She states that she gave pt a z-lorenzo and prednisone and information for what to do in an emergency. She will follow up in a few weeks but notes pt is using his inhaler 6-7 times per day every day on a regular basis. She would like pt to have a pulmonology referral. Please advise. Call back if needed is [phone].

## 2024-07-23 ENCOUNTER — APPOINTMENT (OUTPATIENT)
Dept: PRIMARY CARE | Facility: CLINIC | Age: 74
End: 2024-07-23
Payer: MEDICARE

## 2024-08-08 ENCOUNTER — OFFICE VISIT (OUTPATIENT)
Dept: PULMONOLOGY | Facility: HOSPITAL | Age: 74
End: 2024-08-08
Payer: MEDICARE

## 2024-08-08 VITALS
TEMPERATURE: 97.9 F | WEIGHT: 119.1 LBS | BODY MASS INDEX: 17.05 KG/M2 | RESPIRATION RATE: 16 BRPM | HEIGHT: 70 IN | OXYGEN SATURATION: 92 % | DIASTOLIC BLOOD PRESSURE: 78 MMHG | HEART RATE: 78 BPM | SYSTOLIC BLOOD PRESSURE: 133 MMHG

## 2024-08-08 DIAGNOSIS — J44.9 COPD, MODERATE (MULTI): Chronic | ICD-10-CM

## 2024-08-08 DIAGNOSIS — F17.210 CIGARETTE NICOTINE DEPENDENCE WITHOUT COMPLICATION: Primary | ICD-10-CM

## 2024-08-08 DIAGNOSIS — F17.200 TOBACCO USE DISORDER: ICD-10-CM

## 2024-08-08 PROCEDURE — 1159F MED LIST DOCD IN RCRD: CPT | Performed by: NURSE PRACTITIONER

## 2024-08-08 PROCEDURE — 99203 OFFICE O/P NEW LOW 30 MIN: CPT | Performed by: NURSE PRACTITIONER

## 2024-08-08 PROCEDURE — 3075F SYST BP GE 130 - 139MM HG: CPT | Performed by: NURSE PRACTITIONER

## 2024-08-08 PROCEDURE — 3078F DIAST BP <80 MM HG: CPT | Performed by: NURSE PRACTITIONER

## 2024-08-08 PROCEDURE — 99213 OFFICE O/P EST LOW 20 MIN: CPT | Performed by: NURSE PRACTITIONER

## 2024-08-08 PROCEDURE — 1123F ACP DISCUSS/DSCN MKR DOCD: CPT | Performed by: NURSE PRACTITIONER

## 2024-08-08 PROCEDURE — 3008F BODY MASS INDEX DOCD: CPT | Performed by: NURSE PRACTITIONER

## 2024-08-08 RX ORDER — FLUTICASONE PROPIONATE AND SALMETEROL 250; 50 UG/1; UG/1
1 POWDER RESPIRATORY (INHALATION)
Qty: 60 EACH | Refills: 5 | Status: SHIPPED | OUTPATIENT
Start: 2024-08-08

## 2024-08-08 ASSESSMENT — COLUMBIA-SUICIDE SEVERITY RATING SCALE - C-SSRS
1. IN THE PAST MONTH, HAVE YOU WISHED YOU WERE DEAD OR WISHED YOU COULD GO TO SLEEP AND NOT WAKE UP?: NO
6. HAVE YOU EVER DONE ANYTHING, STARTED TO DO ANYTHING, OR PREPARED TO DO ANYTHING TO END YOUR LIFE?: NO
2. HAVE YOU ACTUALLY HAD ANY THOUGHTS OF KILLING YOURSELF?: NO

## 2024-08-08 ASSESSMENT — PATIENT HEALTH QUESTIONNAIRE - PHQ9
1. LITTLE INTEREST OR PLEASURE IN DOING THINGS: NOT AT ALL
SUM OF ALL RESPONSES TO PHQ9 QUESTIONS 1 AND 2: 0
2. FEELING DOWN, DEPRESSED OR HOPELESS: NOT AT ALL

## 2024-08-08 ASSESSMENT — ENCOUNTER SYMPTOMS
DEPRESSION: 0
OCCASIONAL FEELINGS OF UNSTEADINESS: 0
LOSS OF SENSATION IN FEET: 0

## 2024-08-08 NOTE — PATIENT INSTRUCTIONS
"Mr. Verner L Blankenship    It was a pleasure to see you in clinic today. We discussed your COPD and shortness of breath.    Based on our conversation, here are my recommendations:   - Start Wixela inhaler 1 puff twice daily - RINSE MOUTH & SPIT AFTER USE --- please call if too expensive and we can try to find a more affordable option  - Continue albuterol Inhaler 1-2 puffs or albuterol nebulizer every 4-6 hours as needed for shortness of breath. You can also take this 10-15 minutes prior to exertional activity to help \"prime\" your lungs.  - I ordered a lung cancer screening CT of your chest; please schedule and complete this  - I ordered breathing tests, please schedule and complete these.    Thank you for visiting the Pulmonary clinic today!   Return to clinic 2 months after PFT/6MW or sooner if needed   Zuleika Powers CNP  My office number is [phone] - Yumiko is my  and Ida is my nurse.     [phone] - scheduling    Any test results will be discussed at next visit -- please make sure to make a follow up appt after testing.    "

## 2024-08-08 NOTE — PROGRESS NOTES
" Patient: Verner L Blankenship    [id number]  : 1950 -- AGE 74 y.o.    Provider: ARAVIND Mera     Location [facility]   Service Date: 2024       Department of Medicine  Division of Pulmonary, Critical Care, and Sleep Medicine       [facility]  New Visit Note    HISTORY OF PRESENT ILLNESS     The patient's referring provider is: Hiro Hunter, *    Chief complaint: Verner L Blankenship \"Carlito\" is a 74 y.o. male who presents to a [facility] for an evaluation with concerns of COPD (Patient here for NPV. Patient here for COPD. Patient states his breathing is about the same. Patient admits to some shortness of breath on occasion. Patient denies a cough. Patient denies any oxygen or cpap use at home. Patient is smoking 1 PPD. Patient has smoked for about 68 years. Patient has no animals at home. Patient has history of working in [employer]. Patient has no other concerns for today.).       HISTORY OF PRESENT ILLNESS:  Mr. Vail is a 74 year old male (current smoker, ~68 pack year history) with a PMHx of COPD, HTN, GERD and BPH. Here as referral from PCP for initial pulmonary evaluation for COPD.     I have independently interviewed and examined the patient in the office and reviewed available records.      Current History    On today's visit, the patient reports he was diagnosed with COPD many years ago. He has BAUTISTA and cough, denies sputum production. Has wheezing, worse with activity such as mowing his lawn. He denies SOB at rest, chest pain/tightness, nasal congestion/post-nasal drip. He has GERD, is controlled on protonix. He denies fever, chills, appetite change, unexpected weight loss, headahces, weakness or tremors.     He was recently on Trelegy and found it was helpful but did not feel it lasted 24 hours. He had to stop taking it recently because the cost increased and he could no longer afford " it. He is currently using his nebulizer inhaler about 5-6 times/day.      Per chart review, patient was treated for AECOPD 7/19/24 with azithromycin & prednisone.    PMHx: COPD, HTN, GERD and BPH    Social Hx:  -smoking: current, 1 PPD since age 6, ~68 pack year history  -ETOH: occasionally  -illicit drugs: denies  -occupation: retired;   -exposures: + exposures to asbestos, dusts, chemicals/fumes; Denies known exposures to silica, beryllium, molds      Fam Hx:   - sister: lung cancer      Prior Notes & History       REVIEW OF SYSTEMS     REVIEW OF SYSTEMS  Review of Systems  10-point ROS complete and negative except as documented in HPI    ALLERGIES AND MEDICATIONS     ALLERGIES  No Known Allergies    MEDICATIONS  Current Outpatient Medications   Medication Sig Dispense Refill    albuterol 2.5 mg /3 mL (0.083 %) nebulizer solution Take 3 mL (2.5 mg) by nebulization every 6 hours if needed for wheezing. 75 mL 11    amLODIPine (Norvasc) 5 mg tablet Take 1 tablet (5 mg) by mouth once daily. 90 tablet 3    ferrous gluconate 236 mg (27 mg iron) tablet Take 1 tablet (27 mg) by mouth every other day.      magnesium aspart,citrate,oxide 400 mg magnesium capsule Take by mouth.      pantoprazole (ProtoNix) 40 mg EC tablet take 1 tablet by mouth once daily 90 tablet 3    tamsulosin (Flomax) 0.4 mg 24 hr capsule Take 1 capsule (0.4 mg) by mouth once daily. 90 capsule 3    Ventolin HFA 90 mcg/actuation inhaler INHALE 2 PUFFS BY MOUTH EVERY 4 HOURS IF NEEDED FOR SHORTNESS OF BREATH 18 g 3    fluticasone-umeclidin-vilanter (Trelegy Ellipta) 100-62.5-25 mcg blister with device Inhale 1 puff once daily. (Patient not taking: Reported on 8/8/2024) 60 each 11     No current facility-administered medications for this visit.       PAST HISTORY     PAST MEDICAL HISTORY  He  has a past medical history of Altered mental status, unspecified, Chronic fatigue, unspecified, Essential (primary) hypertension, Personal history of  "diseases of the blood and blood-forming organs and certain disorders involving the immune mechanism (06/22/2020), Personal history of other diseases of male genital organs, Personal history of other diseases of the digestive system (06/22/2020), Personal history of other diseases of the digestive system, Personal history of urinary (tract) infections, and Spondylosis without myelopathy or radiculopathy, 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    PAST SURGICAL HISTORY  Past Surgical History:   Procedure Laterality Date    OTHER SURGICAL HISTORY  06/22/2020    Esophagus surgery    OTHER SURGICAL HISTORY  06/22/2020    Knee surgery       IMMUNIZATION HISTORY  Immunization History   Administered Date(s) Administered    Flu vaccine, quadrivalent, high-dose, preservative free, age 65y+ (FLUZONE) 10/08/2021, 11/01/2022    Flu vaccine, trivalent, preservative free, HIGH-DOSE, age 65y+ (Fluzone) 11/07/2017    Flu vaccine, trivalent, preservative free, age 6 months and greater (Fluarix/Fluzone/Flulaval) 11/17/2014, 09/05/2015    Influenza, Seasonal, Quadrivalent, Adjuvanted 10/13/2020, 10/24/2023    Influenza, Unspecified 10/22/2013    Influenza, seasonal, injectable 09/21/2012, 10/04/2016, 09/26/2018, 10/22/2019    Hany SARS-CoV-2 Vaccination 03/17/2021    Moderna COVID-19 vaccine, Fall 2023, 12 yeasrs and older (50mcg/0.5mL) 10/24/2023    Moderna COVID-19 vaccine, bivalent, blue cap/gray label *Check age/dose* 11/01/2022    Moderna SARS-CoV-2 Vaccination 01/11/2022, 07/18/2022    Pneumococcal conjugate vaccine, 20-valent (PREVNAR 20) 06/06/2024    Pneumococcal polysaccharide vaccine, 23-valent, age 2 years and older (PNEUMOVAX 23) 10/22/2013, 01/08/2019         PHYSICAL EXAM     VITAL SIGNS: /78   Pulse 78   Temp 36.6 °C (97.9 °F)   Resp 16   Ht 1.778 m (5' 10\")   Wt 54 kg (119 lb 1.6 oz)   SpO2 92% Comment: RA  BMI 17.09 kg/m²      PREVIOUS WEIGHTS:  Wt Readings from Last 3 Encounters:   08/08/24 54 kg (119 lb 1.6 oz) "   06/06/24 54.4 kg (120 lb)   12/05/23 51.3 kg (113 lb)       Physical Exam  Vitals reviewed.   Constitutional:       General: He is not in acute distress.     Comments: thin   HENT:      Head: Normocephalic.      Mouth/Throat:      Mouth: Mucous membranes are moist.   Eyes:      General: No scleral icterus.  Cardiovascular:      Rate and Rhythm: Normal rate and regular rhythm.      Pulses: Normal pulses.      Heart sounds: Normal heart sounds.   Pulmonary:      Effort: Pulmonary effort is normal. No respiratory distress.      Breath sounds: Wheezing (faint inspiratory wheeze bilaterally) present. No rhonchi or rales.   Musculoskeletal:         General: Normal range of motion.      Right lower leg: No edema.      Left lower leg: No edema.   Skin:     General: Skin is warm and dry.   Neurological:      Mental Status: He is alert and oriented to person, place, and time.   Psychiatric:         Mood and Affect: Mood normal.         Behavior: Behavior normal.           RESULTS/DATA     Pulmonary Function Test Results     No PFT on record    Chest Radiograph     No results found for this or any previous visit from the past 365 days.      Chest CT Scan     No results found for this or any previous visit from the past 365 days.      Echocardiogram & Cardiac Studies     No results found for this or any previous visit from the past 365 days.       Labwork & Pathology   Complete Blood Count  Lab Results   Component Value Date    WBC 8.4 06/06/2024    HGB 14.5 06/06/2024    HCT 44.0 06/06/2024    MCV 99 06/06/2024     06/06/2024       ASSESSMENT/PLAN     Mr. Vail is a 74 year old male (current smoker, ~68 pack year history) with a PMHx of COPD, HTN, GERD and BPH. Here as referral from PCP for initial pulmonary evaluation for COPD.     Problem List and Orders  Problem List Items Addressed This Visit             ICD-10-CM    COPD, moderate (Multi) (Chronic) J44.9    Relevant Medications    fluticasone  propion-salmeteroL (Wixela Inhub) 250-50 mcg/dose diskus inhaler    Other Relevant Orders    Complete Pulmonary Function Test Pre/Post Bronchodialator (Spirometry Pre/Post/DLCO/Lung Volumes)    Pulmonary Stress Test (6 Min. Walk)    Referral to Clinical Pharmacy    Tobacco use disorder F17.200     Other Visit Diagnoses         Codes    Cigarette nicotine dependence without complication    -  Primary F17.210    Relevant Orders    CT lung screening low dose            Assessment and Plan / Recommendations:  1) COPD - no PFTs on file; current smoker, reports about 1 PPD since age 6; was on Trelegy recently but did not last long enough and price increased and he can no longer afford  - start Wixela  - continue PRN albuterol MDI/nebs  - ordered PFT/6MW  - at next visit, may consider changing albuterol neb to duoneb so has LAMA included if unable to get financial assistance for inhalers   - placed referral to pharmacy for possible financial assistance for inhalers    2) Cigarette nicotine dependence - current smoker, about 1 PPD since age 6, ~68 pack year history  - ordered LDCT  - will discuss smoking cessation at next visit      RTC 2 months after PFT/CT

## 2024-08-14 DIAGNOSIS — N40.0 HYPERTROPHY OF PROSTATE: ICD-10-CM

## 2024-08-14 RX ORDER — TAMSULOSIN HYDROCHLORIDE 0.4 MG/1
0.4 CAPSULE ORAL DAILY
Qty: 90 CAPSULE | Refills: 3 | Status: SHIPPED | OUTPATIENT
Start: 2024-08-14

## 2024-08-15 DIAGNOSIS — J44.9 COPD, MODERATE (MULTI): ICD-10-CM

## 2024-08-15 RX ORDER — ALBUTEROL SULFATE 90 UG/1
2 AEROSOL, METERED RESPIRATORY (INHALATION) EVERY 4 HOURS PRN
Qty: 18 G | Refills: 3 | Status: SHIPPED | OUTPATIENT
Start: 2024-08-15

## 2024-08-21 ENCOUNTER — HOSPITAL ENCOUNTER (OUTPATIENT)
Dept: RADIOLOGY | Facility: CLINIC | Age: 74
Discharge: HOME | End: 2024-08-21
Payer: MEDICARE

## 2024-08-21 DIAGNOSIS — F17.210 CIGARETTE NICOTINE DEPENDENCE WITHOUT COMPLICATION: ICD-10-CM

## 2024-08-21 PROCEDURE — 71271 CT THORAX LUNG CANCER SCR C-: CPT

## 2024-08-22 ENCOUNTER — TELEPHONE (OUTPATIENT)
Dept: PULMONOLOGY | Facility: HOSPITAL | Age: 74
End: 2024-08-22
Payer: MEDICARE

## 2024-08-22 DIAGNOSIS — R91.8 LUNG NODULES: Primary | ICD-10-CM

## 2024-08-22 NOTE — TELEPHONE ENCOUNTER
Called and spoke with patient regarding CT results. I transferred him to central scheduling to schedule a 3 month follow up CT. I instructed patient to call us back with any further questions or concerns. Patient was agreeable to treatment plan and acknowledged understanding.     ----- Message from Zuleika Powers sent at 8/22/2024  8:12 AM EDT -----  Please call Mr. Vail and let him know that his CT showed a few nodules so we will repeat a CT in 3 months. I ordered the CT today so he can call and schedule to have it completed around 11/21/24.

## 2024-09-03 ENCOUNTER — HOSPITAL ENCOUNTER (OUTPATIENT)
Dept: RESPIRATORY THERAPY | Facility: HOSPITAL | Age: 74
Discharge: HOME | End: 2024-09-03
Payer: MEDICARE

## 2024-09-03 DIAGNOSIS — J44.9 COPD, MODERATE (MULTI): Chronic | ICD-10-CM

## 2024-09-03 LAB
MGC ASCENT PFT - FEV1 - POST: 0.91
MGC ASCENT PFT - FEV1 - PRE: 0.86
MGC ASCENT PFT - FEV1 - PREDICTED: 2.94
MGC ASCENT PFT - FVC - POST: 3.59
MGC ASCENT PFT - FVC - PRE: 3.22
MGC ASCENT PFT - FVC - PREDICTED: 3.94

## 2024-09-03 PROCEDURE — 2500000001 HC RX 250 WO HCPCS SELF ADMINISTERED DRUGS (ALT 637 FOR MEDICARE OP): Performed by: NURSE PRACTITIONER

## 2024-09-03 PROCEDURE — 94640 AIRWAY INHALATION TREATMENT: CPT

## 2024-09-03 PROCEDURE — 94618 PULMONARY STRESS TESTING: CPT

## 2024-09-03 PROCEDURE — 94726 PLETHYSMOGRAPHY LUNG VOLUMES: CPT

## 2024-09-03 RX ORDER — ALBUTEROL SULFATE 90 UG/1
4 INHALANT RESPIRATORY (INHALATION) ONCE
Status: COMPLETED | OUTPATIENT
Start: 2024-09-03 | End: 2024-09-03

## 2024-09-09 NOTE — PROGRESS NOTES
"  Pharmacist Clinic: Pulmonary Management    Verner L Blankenship \"Carlito\" is a 74 y.o. male was referred to Clinical Pharmacy Team for Pulmonary assessment.   Referring Provider: Zuleika Powers APRN*  Last visit: 8/8/24  Next visit: 10/10/24    Subjective   No Known Allergies    Patient referred for cost assistance with inhalers. Was previously on Trelegy, however stopped it due to cost and ineffectiveness. Will discuss options with patient. Would likely recommend starting a LAMA in addition to his current Wixela for triple therapy coverage.    PULMONARY ASSESSMENT  Patient has been diagnosed with: COPD  has not had PFT's completed in last 2 years    Current Regimen:  Wixela 1 puff BID  Albuterol PRN (nebs and inhaler)    Historical Treatment:  Trelegy - stopped due to cost and said it didn't last all day     Symptom Management:  Current symptoms: dyspnea, cough, and wheezing  Triggers: mostly in the mornings, exertion   Alleviating factors: inhaler     Exacerbation Hx:  When was your last hospitalization for an exacerbation? 7/19/24  When was the last time you were treated with antibiotics and/or steroids? 7/19/24 - azithromycin and prednisone      Rescue Inhaler Use:  How often do you use your rescue inhaler? Every day    Smoking history  - He has a history of 68 pack years. (1 PPD x 68 years) - current smoker       Objective   There were no vitals taken for this visit.    Secondary Prevention (vaccines):  -Influenza: gets yearly  -PCV13: unknown  -PPSV23: 1/8/2019  -PCV20: 6/6/2024  -COVID: 10/24/2023  -RSV: recommended   -TDAP: unknown  -Shingrix: unknown     Pulmonary Functions Testing Results:  No results found for: \"FEV1\", \"FVC\", \"XSP3JOE\", \"TLC\", \"DLCO\"    Lab Review  Lab Results   Component Value Date    BILITOT 0.5 06/06/2024    CALCIUM 9.3 06/06/2024    CO2 28 06/06/2024     06/06/2024    CREATININE 0.79 06/06/2024    GLUCOSE 100 (H) 06/06/2024    ALKPHOS 80 06/06/2024    K 3.7 06/06/2024    PROT " 7.0 06/06/2024     06/06/2024    AST 14 06/06/2024    ALT 8 (L) 06/06/2024    BUN 16 06/06/2024    ANIONGAP 12 06/06/2024    MG 1.78 06/06/2024    ALBUMIN 4.1 06/06/2024    GFRMALE >90 05/22/2023     Hemoglobin   Date Value Ref Range Status   06/06/2024 14.5 13.5 - 17.5 g/dL Final     WBC   Date Value Ref Range Status   06/06/2024 8.4 4.4 - 11.3 x10*3/uL Final     Platelets   Date Value Ref Range Status   06/06/2024 325 150 - 450 x10*3/uL Final       The ASCVD Risk score (Meghan MOYA, et al., 2019) failed to calculate for the following reasons:    Cannot find a previous HDL lab    Cannot find a previous total cholesterol lab    Current Outpatient Medications   Medication Instructions    albuterol (Ventolin HFA) 90 mcg/actuation inhaler 2 puffs, inhalation, Every 4 hours PRN    albuterol 2.5 mg, nebulization, Every 6 hours PRN    amLODIPine (NORVASC) 5 mg, oral, Daily    ferrous gluconate 236 mg (27 mg iron) tablet 1 tablet, oral, Every other day    fluticasone propion-salmeteroL (Wixela Inhub) 250-50 mcg/dose diskus inhaler 1 puff, inhalation, 2 times daily RT, Rinse mouth with water after use to reduce aftertaste and incidence of candidiasis. Do not swallow.    fluticasone-umeclidin-vilanter (Trelegy Ellipta) 100-62.5-25 mcg blister with device 1 puff, inhalation, Daily    Incruse Ellipta 62.5 mcg, inhalation, Daily    magnesium aspart,citrate,oxide 400 mg magnesium capsule oral    pantoprazole (PROTONIX) 40 mg, oral, Daily    tamsulosin (FLOMAX) 0.4 mg, oral, Daily       Drug Interactions:  None requiring intervention    Affordability/Accessibility:   Patient Assistance Screening (VAF)  Patient verbally reports monthly or yearly income which is less than 400% federal poverty level  Application for program has been submitted for the following medications:   Incruse  Patient aware this process may take up to 2 weeks once income documents have been sent to the team.  If approved, medication must be filled  through [facility] pharmacy and may be picked up or mailed to patient.   If approved, medication will be billed through insurance, and patient assistance team will pay the copay. This will result in a $0 copay for the patient.  Counseled patient on mechanism of action, side effects, contraindications, and what to do if the patient misses a dose. All patients questions were answered.      Assessment/Plan   Problem List Items Addressed This Visit       COPD, moderate (Multi) (Chronic)    Relevant Medications    umeclidinium (Incruse Ellipta) 62.5 mcg/actuation inhalation    Other Relevant Orders    Follow Up In Clinical Pharmacy       ASSESSMENT:  Patient still uses his rescue inhaler and/or nebulizer at least daily. He would benefit from the addition of a LAMA inhaler to the Wixela for his COPD. He used to use Trelegy, but says that inhaler didn't work well for him. Because of this, will add a LAMA only inhaler instead of re-starting a triple therapy inhaler. Would prefer a respimat inhaler, however his insurance only covers Incruse. The copay is still high, so will start the process for the PAP application.     PLAN:  START:  Incruse Ellipta 1 puff daily - pending PAP approval  CONTINUE:  Wixela 1 puff BID  Albuterol PRN     Clinical Pharmacy Follow-Up: 2 weeks     Carol Ledesma PharmD  Clinical Pharmacy Specialist  [phone]    Continue all meds under the continuation of care with the referring provider and clinical pharmacy team.    Verbal consent to manage patient's drug therapy was obtained from the patient. They were informed they may decline to participate or withdraw from participation in pharmacy services at any time.

## 2024-09-10 ENCOUNTER — APPOINTMENT (OUTPATIENT)
Dept: PHARMACY | Facility: HOSPITAL | Age: 74
End: 2024-09-10
Payer: MEDICARE

## 2024-09-10 DIAGNOSIS — J44.9 COPD, MODERATE (MULTI): Chronic | ICD-10-CM

## 2024-09-10 PROCEDURE — RXMED WILLOW AMBULATORY MEDICATION CHARGE

## 2024-09-10 RX ORDER — UMECLIDINIUM 62.5 UG/1
1 AEROSOL, POWDER ORAL DAILY
Qty: 30 EACH | Refills: 11 | Status: SHIPPED | OUTPATIENT
Start: 2024-09-10

## 2024-09-13 ENCOUNTER — PHARMACY VISIT (OUTPATIENT)
Dept: PHARMACY | Facility: CLINIC | Age: 74
End: 2024-09-13
Payer: MEDICARE

## 2024-09-23 NOTE — PROGRESS NOTES
"  Pharmacist Clinic: Pulmonary Management    Verner L Blankenship \"Carlito\" is a 74 y.o. male was referred to Clinical Pharmacy Team for Pulmonary assessment.   Referring Provider: Zuleika Powers APRN*  Last visit: 8/8/24  Next visit: 10/10/24    Subjective   No Known Allergies    Patient referred for cost assistance with inhalers. Was previously on Trelegy, however stopped it due to cost and ineffectiveness. Will discuss options with patient. Would likely recommend starting a LAMA in addition to his current Wixela for triple therapy coverage.    PULMONARY ASSESSMENT  Patient has been diagnosed with: COPD  has not had PFT's completed in last 2 years    Current Regimen:  Incruse 1 puff daily   Wixela 1 puff BID  Albuterol PRN (nebs and inhaler)    Historical Treatment:  Trelegy - stopped due to cost and said it didn't last all day     Symptom Management:  Current symptoms: dyspnea, cough, and wheezing  Triggers: mostly in the mornings, exertion   Alleviating factors: inhaler     Exacerbation Hx:  When was your last hospitalization for an exacerbation? 7/19/24  When was the last time you were treated with antibiotics and/or steroids? 7/19/24 - azithromycin and prednisone      Rescue Inhaler Use:  How often do you use your rescue inhaler? Every day, but less than before    Smoking history  - He has a history of 68 pack years. (1 PPD x 68 years) - current smoker       Objective   There were no vitals taken for this visit.    Secondary Prevention (vaccines):  -Influenza: gets yearly  -PCV13: unknown  -PPSV23: 1/8/2019  -PCV20: 6/6/2024  -COVID: 10/24/2023  -RSV: recommended   -TDAP: unknown  -Shingrix: unknown     Pulmonary Functions Testing Results:  No results found for: \"FEV1\", \"FVC\", \"GJI7BIV\", \"TLC\", \"DLCO\"    Lab Review  Lab Results   Component Value Date    BILITOT 0.5 06/06/2024    CALCIUM 9.3 06/06/2024    CO2 28 06/06/2024     06/06/2024    CREATININE 0.79 06/06/2024    GLUCOSE 100 (H) 06/06/2024    " ALKPHOS 80 06/06/2024    K 3.7 06/06/2024    PROT 7.0 06/06/2024     06/06/2024    AST 14 06/06/2024    ALT 8 (L) 06/06/2024    BUN 16 06/06/2024    ANIONGAP 12 06/06/2024    MG 1.78 06/06/2024    ALBUMIN 4.1 06/06/2024    GFRMALE >90 05/22/2023     Hemoglobin   Date Value Ref Range Status   06/06/2024 14.5 13.5 - 17.5 g/dL Final     WBC   Date Value Ref Range Status   06/06/2024 8.4 4.4 - 11.3 x10*3/uL Final     Platelets   Date Value Ref Range Status   06/06/2024 325 150 - 450 x10*3/uL Final       The ASCVD Risk score (Meghan MOYA, et al., 2019) failed to calculate for the following reasons:    Cannot find a previous HDL lab    Cannot find a previous total cholesterol lab    Current Outpatient Medications   Medication Instructions    albuterol (Ventolin HFA) 90 mcg/actuation inhaler 2 puffs, inhalation, Every 4 hours PRN    albuterol 2.5 mg, nebulization, Every 6 hours PRN    amLODIPine (NORVASC) 5 mg, oral, Daily    ferrous gluconate 236 mg (27 mg iron) tablet 1 tablet, oral, Every other day    fluticasone propion-salmeteroL (Wixela Inhub) 250-50 mcg/dose diskus inhaler 1 puff, inhalation, 2 times daily RT, Rinse mouth with water after use to reduce aftertaste and incidence of candidiasis. Do not swallow.    Incruse Ellipta 62.5 mcg, inhalation, Daily    magnesium aspart,citrate,oxide 400 mg magnesium capsule oral    pantoprazole (PROTONIX) 40 mg, oral, Daily    tamsulosin (FLOMAX) 0.4 mg, oral, Daily       Drug Interactions:  None requiring intervention    Affordability/Accessibility:   Patient Assistance for Incruse Ellipta approved through 9/10/2025. Will have to be renewed prior to that date to prevent lapse in coverage. Medication(s) will be received at no cost to patient from [facility].       Assessment/Plan   Problem List Items Addressed This Visit       COPD, moderate (Multi) (Chronic)    Relevant Orders    Follow Up In Clinical Pharmacy         ASSESSMENT:  Patient still uses his rescue  inhaler and/or nebulizer at least daily. He would benefit from the addition of a LAMA inhaler to the Wixela for his COPD. He used to use Trelegy, but says that inhaler didn't work well for him. Because of this, will add a LAMA only inhaler instead of re-starting a triple therapy inhaler. Would prefer a respimat inhaler, however his insurance only covers Incruse. Patient was approved for patient assistance. Patient says he is tolerating the Incruse well and it is working well for him in addition to the Wixela.     PLAN:  CONTINUE:  Incruse Ellipta 1 puff daily  Wixela 1 puff BID  Albuterol PRN     Clinical Pharmacy Follow-Up: 6 months     Carol Ledesma PharmD  Clinical Pharmacy Specialist  [phone]    Continue all meds under the continuation of care with the referring provider and clinical pharmacy team.    Verbal consent to manage patient's drug therapy was obtained from the patient. They were informed they may decline to participate or withdraw from participation in pharmacy services at any time.

## 2024-09-24 ENCOUNTER — APPOINTMENT (OUTPATIENT)
Dept: PHARMACY | Facility: HOSPITAL | Age: 74
End: 2024-09-24
Payer: MEDICARE

## 2024-09-24 DIAGNOSIS — J44.9 COPD, MODERATE (MULTI): Chronic | ICD-10-CM

## 2024-10-10 ENCOUNTER — OFFICE VISIT (OUTPATIENT)
Dept: PULMONOLOGY | Facility: HOSPITAL | Age: 74
End: 2024-10-10
Payer: MEDICARE

## 2024-10-10 VITALS
HEART RATE: 86 BPM | RESPIRATION RATE: 16 BRPM | WEIGHT: 118.5 LBS | HEIGHT: 70 IN | OXYGEN SATURATION: 94 % | BODY MASS INDEX: 16.96 KG/M2 | SYSTOLIC BLOOD PRESSURE: 126 MMHG | DIASTOLIC BLOOD PRESSURE: 75 MMHG

## 2024-10-10 DIAGNOSIS — J44.9 STAGE 4 VERY SEVERE CHRONIC OBSTRUCTIVE PULMONARY DISEASE BY GLOBAL INITIATIVE FOR CHRONIC OBSTRUCTIVE LUNG DISEASE CLASSIFICATION (MULTI): Primary | ICD-10-CM

## 2024-10-10 DIAGNOSIS — F17.210 CIGARETTE NICOTINE DEPENDENCE WITHOUT COMPLICATION: ICD-10-CM

## 2024-10-10 PROCEDURE — 99215 OFFICE O/P EST HI 40 MIN: CPT | Performed by: NURSE PRACTITIONER

## 2024-10-10 PROCEDURE — 3074F SYST BP LT 130 MM HG: CPT | Performed by: NURSE PRACTITIONER

## 2024-10-10 PROCEDURE — 1159F MED LIST DOCD IN RCRD: CPT | Performed by: NURSE PRACTITIONER

## 2024-10-10 PROCEDURE — 3078F DIAST BP <80 MM HG: CPT | Performed by: NURSE PRACTITIONER

## 2024-10-10 PROCEDURE — 4004F PT TOBACCO SCREEN RCVD TLK: CPT | Performed by: NURSE PRACTITIONER

## 2024-10-10 PROCEDURE — 3008F BODY MASS INDEX DOCD: CPT | Performed by: NURSE PRACTITIONER

## 2024-10-10 PROCEDURE — 1123F ACP DISCUSS/DSCN MKR DOCD: CPT | Performed by: NURSE PRACTITIONER

## 2024-10-10 RX ORDER — IBUPROFEN 200 MG
TABLET ORAL
Qty: 42 PATCH | Refills: 0 | Status: SHIPPED | OUTPATIENT
Start: 2024-10-10

## 2024-10-10 RX ORDER — IPRATROPIUM BROMIDE AND ALBUTEROL SULFATE 2.5; .5 MG/3ML; MG/3ML
3 SOLUTION RESPIRATORY (INHALATION) EVERY 4 HOURS PRN
Qty: 360 ML | Refills: 5 | Status: SHIPPED | OUTPATIENT
Start: 2024-10-10

## 2024-10-10 RX ORDER — ASPIRIN/CALCIUM CARB/MAGNESIUM 325 MG
TABLET ORAL
Qty: 200 LOZENGE | Refills: 2 | Status: SHIPPED | OUTPATIENT
Start: 2024-10-10

## 2024-10-10 NOTE — PATIENT INSTRUCTIONS
"Mr. Verner L Blankenship    It was a pleasure to see you in clinic today. We discussed your COPD and shortness of breath.    Based on our conversation, here are my recommendations:   - Start using the Wixela inhaler 1 puff twice daily - RINSE MOUTH & SPIT AFTER USE   - Continue Incruse 1 puff one daily  - Continue albuterol Inhaler 1-2 puffs or DuoNeb nebulizer (I sent this prescription to the pharmacy) every 4-6 hours as needed for shortness of breath. You can also take this 10-15 minutes prior to exertional activity to help \"prime\" your lungs.  - I sent prescriptions for nicotine patches and lozenges to your pharmacy, please use these to attempt to stop smoking.  - We discussed the lung nodules on your CT. You have a repeat CT scheduled for 11/22/24 to follow-up on these, please get this completed.   - I ordered pulmonary rehab, they will call you to schedule your appointment.     As we discussed, I am leaving and will no longer be seeing patients. Please schedule your follow-up appointment with Fidelia Wilks CNP in 2 months.     Any test results will be discussed at next visit -- please make sure to make a follow up appt after testing.    Thank you for visiting the Pulmonary clinic today!   Zuleika Powers CNP    My office number is [phone] -  Ida is my nurse.     Radiology scheduling [phone]     Appointment scheduling [phone]    "

## 2024-10-10 NOTE — PROGRESS NOTES
" Patient: Verner L Blankenship    [id number]  : 1950 -- AGE 74 y.o.    Provider: ARAVIND Mera     Location [facility]   Service Date: 10/10/2024       Department of Medicine  Division of Pulmonary, Critical Care, and Sleep Medicine       [facility]  Established Patient Visit Note    HISTORY OF PRESENT ILLNESS     The patient's referring provider is: No ref. provider found    Chief complaint:  Verner L Blankenship \"Carlito\" is a 74 y.o. male who presents to a [facility] for an evaluation with concerns of Shortness of Breath (Patient is here for a follow up visit. Patient says his breathing is about the same, he admits to shortness of breath on exertion. Patient admits to a cough in the mornings. Patient is using rescue inhaler very often. Patient is using nebulizer once a day. Patient is not on a cpap or oxygen. Patient is a current smoker and is smoking 1 ppd. Patient has no other concerns for today. ).     HISTORY OF PRESENT ILLNESS:  Mr. Vail is a 74 year old male (current smoker, ~68 pack year history) with a PMHx of COPD, HTN, GERD and BPH. Here for pulmonary follow-up for COPD.     I have independently interviewed and examined the patient in the office and reviewed available records.    DATE OF LAST VISIT:  24      Current History    On today's visit, the patient reports his breathing is about the same. He spoke with pharmacy and was started on Incruse and has been using that 1 puff once daily however he stopped using the Wixela because he didn't realize he was supposed to keep using it also. He is using his albuterol nebulizer about once/day and albuterol inhaler a minimum of 4 times/day. Provided inhaler education, advised continue Incruse 1 puff once daily and restart Wixela 1 puff BID.     BAUTISTA: with minimal activity  SOB at Rest: denies  Cough: first thing in the morning, clear " sputum  Wheezing:  frequently  Allergies:  denies  GERD:  controlled on pantoprazole  Chest Pain: denies      Prior Notes & History     8/8/24: On today's visit, the patient reports he was diagnosed with COPD many years ago. He has BAUTISTA and cough, denies sputum production. Has wheezing, worse with activity such as mowing his lawn. He denies SOB at rest, chest pain/tightness, nasal congestion/post-nasal drip. He has GERD, is controlled on protonix. He denies fever, chills, appetite change, unexpected weight loss, headahces, weakness or tremors.      He was recently on Trelegy and found it was helpful but did not feel it lasted 24 hours. He had to stop taking it recently because the cost increased and he could no longer afford it. He is currently using his nebulizer inhaler about 5-6 times/day.      Per chart review, patient was treated for AECOPD 7/19/24 with azithromycin & prednisone.     PMHx: COPD, HTN, GERD and BPH     Social Hx:  -smoking: current, 1 PPD since age 6, ~68 pack year history  -ETOH: occasionally  -illicit drugs: denies  -occupation: retired;   -exposures: + exposures to asbestos, dusts, chemicals/fumes; Denies known exposures to silica, beryllium, molds     Fam Hx:   - sister: lung cancer    REVIEW OF SYSTEMS     REVIEW OF SYSTEMS  Review of Systems  10-point ROS complete and negative except as documented in HPI      ALLERGIES AND MEDICATIONS     ALLERGIES  No Known Allergies    MEDICATIONS  Current Outpatient Medications   Medication Sig Dispense Refill    albuterol (Ventolin HFA) 90 mcg/actuation inhaler inhale 2 puffs by mouth every 4 hours if needed for shortness of breath 18 g 3    albuterol 2.5 mg /3 mL (0.083 %) nebulizer solution Take 3 mL (2.5 mg) by nebulization every 6 hours if needed for wheezing. 75 mL 11    amLODIPine (Norvasc) 5 mg tablet Take 1 tablet (5 mg) by mouth once daily. 90 tablet 3    ferrous gluconate 236 mg (27 mg iron) tablet Take 1 tablet (27 mg) by  mouth every other day.      magnesium aspart,citrate,oxide 400 mg magnesium capsule Take by mouth.      pantoprazole (ProtoNix) 40 mg EC tablet take 1 tablet by mouth once daily 90 tablet 3    tamsulosin (Flomax) 0.4 mg 24 hr capsule take 1 capsule by mouth once daily 90 capsule 3    umeclidinium (Incruse Ellipta) 62.5 mcg/actuation inhalation Inhale 1 puff (62.5 mcg) once daily. 30 each 11    fluticasone propion-salmeteroL (Wixela Inhub) 250-50 mcg/dose diskus inhaler Inhale 1 puff 2 times a day. Rinse mouth with water after use to reduce aftertaste and incidence of candidiasis. Do not swallow. (Patient not taking: Reported on 10/10/2024) 60 each 5     No current facility-administered medications for this visit.       PAST HISTORY     PAST MEDICAL HISTORY  He  has a past medical history of Altered mental status, unspecified, Chronic fatigue, unspecified, Essential (primary) hypertension, Personal history of diseases of the blood and blood-forming organs and certain disorders involving the immune mechanism (06/22/2020), Personal history of other diseases of male genital organs, Personal history of other diseases of the digestive system (06/22/2020), Personal history of other diseases of the digestive system, Personal history of urinary (tract) infections, and Spondylosis without myelopathy or radiculopathy, 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    PAST SURGICAL HISTORY  Past Surgical History:   Procedure Laterality Date    OTHER SURGICAL HISTORY  06/22/2020    Esophagus surgery    OTHER SURGICAL HISTORY  06/22/2020    Knee surgery       IMMUNIZATION HISTORY  Immunization History   Administered Date(s) Administered    Flu vaccine, quadrivalent, high-dose, preservative free, age 65y+ (FLUZONE) 10/08/2021, 11/01/2022    Flu vaccine, trivalent, preservative free, HIGH-DOSE, age 65y+ (Fluzone) 11/07/2017    Flu vaccine, trivalent, preservative free, age 6 months and greater (Fluarix/Fluzone/Flulaval) 11/17/2014, 09/05/2015     "Influenza, Seasonal, Quadrivalent, Adjuvanted 10/13/2020, 10/24/2023    Influenza, Unspecified 10/22/2013    Influenza, seasonal, injectable 09/21/2012, 10/04/2016, 09/26/2018, 10/22/2019    Hany SARS-CoV-2 Vaccination 03/17/2021    Moderna COVID-19 vaccine, 12 years and older (50mcg/0.5mL)(Spikevax) 10/24/2023    Moderna COVID-19 vaccine, bivalent, blue cap/gray label *Check age/dose* 11/01/2022    Moderna SARS-CoV-2 Vaccination 01/11/2022, 07/18/2022    Pneumococcal conjugate vaccine, 20-valent (PREVNAR 20) 06/06/2024    Pneumococcal polysaccharide vaccine, 23-valent, age 2 years and older (PNEUMOVAX 23) 10/22/2013, 01/08/2019       PHYSICAL EXAM     VITAL SIGNS: /75   Pulse 86   Resp 16   Ht 1.778 m (5' 10\")   Wt 53.8 kg (118 lb 8 oz)   SpO2 94% Comment: RA  BMI 17.00 kg/m²      PREVIOUS WEIGHTS:  Wt Readings from Last 3 Encounters:   10/10/24 53.8 kg (118 lb 8 oz)   08/08/24 54 kg (119 lb 1.6 oz)   06/06/24 54.4 kg (120 lb)       Physical Exam  Vitals reviewed.   Constitutional:       General: He is not in acute distress.     Appearance: He is not ill-appearing.      Comments: Very thin   HENT:      Mouth/Throat:      Mouth: Mucous membranes are moist.   Eyes:      General: No scleral icterus.  Cardiovascular:      Rate and Rhythm: Normal rate and regular rhythm.      Pulses: Normal pulses.      Heart sounds: Normal heart sounds.   Pulmonary:      Effort: Pulmonary effort is normal. No respiratory distress.      Comments: Diminished throughout; no wheezing, rhonchi or crackles   Musculoskeletal:         General: Normal range of motion.      Right lower leg: No edema.      Left lower leg: No edema.   Skin:     General: Skin is warm and dry.   Neurological:      General: No focal deficit present.      Mental Status: He is alert and oriented to person, place, and time.   Psychiatric:         Mood and Affect: Mood normal.         Behavior: Behavior normal.       RESULTS/DATA     Pulmonary Function " Test Results     9/3/24: Very severe obstruction (FEV1/FVC .25, FEV1 30%, FVC 91% (borderline BD response on FVC), RV/%, DLCO 46%    9/3/24 6MW:  The patient underwent a 6-minute walk. Resting room air saturation was 94%. Patient ambulated for 313 m. Dyspnea score increased from 0-8. The lowest recorded saturation while ambulating on room air was 90%. Impression: 1. No desaturation while ambulating on room air     Chest Radiograph     No results found for this or any previous visit from the past 365 days.    Chest CT Scan     CT lung screening low dose 08/21/2024    Narrative  Interpreted By:  Liu Rodriguez,  STUDY:  CT LUNG SCREENING LOW DOSE; 8/21/2024 10:54 am    INDICATION:  Smoker screening    COMPARISON:  Prior study 09/06/2015    ACCESSION NUMBER(S):  [id number]    ORDERING CLINICIAN:  LESTER ERIC    TECHNIQUE:  Helical data acquisition of the chest was obtained without IV  contrast material.  Images were reformatted in axial, coronal, and  sagittal planes.    FINDINGS:  LUNGS AND AIRWAYS:  Moderately severe emphysema with upper lung zone predilection,  generalized airway thickening and lung hyperexpansion. New somewhat  irregular nodular density in the right apex measuring up to 10 mm on  series 203, image 70. Some other stable areas of mild scarring and  nodularity in the upper lungs. New 9 x 5 mm subpleural posteromedial  right upper lobe nodular density on image 96, favored to be on the  basis of atelectasis. There is a nodular density within the  subpleural right lower lobe measuring 8 mm which is near the level of  a small thin-walled cyst which was present in this area on the prior  exam. Scattered areas of airway impaction are again noted in the mid  to lower lungs, improved on the left side as compared to the prior  exam. Benign fat containing Bochdalek hernias again present  bilaterally, larger right than left.    MEDIASTINUM AND AJ, LOWER NECK AND AXILLA:  No significantly enlarged  intrathoracic lymph nodes. Tiny hiatal  hernia again noted. No masses are identified in the lower neck    HEART AND VESSELS:  Normal heart size. No pericardial effusion. The ascending thoracic  aortic caliber is top-normal, measures 4.0 cm mid ascending level,  was 3.9 cm by my measurement on the immediate prior. Normal caliber  pulmonary trunk. Densely calcified coronary arteries and possible  stents. Scattered atherosclerotic calcifications elsewhere    UPPER ABDOMEN:  Upper abdominal atherosclerotic calcifications. No acute upper  abdominal finding is identified    CHEST WALL AND OSSEOUS STRUCTURES:  No significant soft tissue findings. No aggressive osseous finding on  CT    Impression  1. New somewhat irregular nodular density at the right apex measures  up to 10 mm, suspicious. New 9 x 5 mm subpleural posteromedial right  upper lobe nodule which is favored to be atelectasis. New 8 mm  subpleural right lower lobe nodule at the level of the thin-walled  cysts present in this area in the prior study, nonspecific, but  favored to be sequela of a collapsed cyst rather than a worrisome  pulmonary nodule. Advise correlation with a PET CT to assess for  metabolic activity of the right apical nodular density, which may be  a primary lung cancer. Another short-term low-dose CT follow-up will  also be required within 3 months to reassess the other new nodules in  the right lung  2. Densely calcified coronary arteries and possible stents.  Additional investigation may be pursued as clinically appropriate.  Borderline mid ascending thoracic aortic caliber, with little change  as compared to the prior exam.    LUNG RADS CATEGORY:  Lung Rad: Lung-RADS 4A, S (Suspicious)    Recommendation: Follow up Low Dose Chest CT in 3 months; may consider  PET/CT if greater than 8mm solid component, recommended as per  American College of Radiology Guidelines Lung-RADS Version 2022.  Recommend F/U with Pulmonologist. Lung-RADS S (Other  non-nodular  findings) Management as appropriate to finding per American College  of Radiology Guidelines Lung-RADS Version 2022.    A yellow alert in epic was placed to ensure clinician receipt of this  abnormal result and recommendation for follow-up by Dr. Rodriguez at 4:29  p.m. on 08/21/2024    MACRO:  None    Signed by: Liu Rodriguez 8/21/2024 4:29 PM  Dictation workstation:   [id number]      Echocardiogram & Cardiac Studies     No results found for this or any previous visit from the past 365 days.       Labwork & Pathology   Complete Blood Count  Lab Results   Component Value Date    WBC 8.4 06/06/2024    HGB 14.5 06/06/2024    HCT 44.0 06/06/2024    MCV 99 06/06/2024     06/06/2024       ASSESSMENT/PLAN     Mr. Vail is a 74 year old male (current smoker, ~68 pack year history) with a PMHx of COPD, HTN, GERD and BPH. Here for pulmonary follow-up for COPD.     Problem List and Orders  Problem List Items Addressed This Visit    None  Visit Diagnoses         Codes    Stage 4 very severe chronic obstructive pulmonary disease by Global Initiative for Chronic Obstructive Lung Disease classification (Multi)    -  Primary J44.9    Relevant Medications    ipratropium-albuteroL (Duo-Neb) 0.5-2.5 mg/3 mL nebulizer solution    Cigarette nicotine dependence without complication     F17.210    Relevant Medications    nicotine (Nicoderm CQ) 21 mg/24 hr patch    nicotine polacrilex (Commit) 4 mg lozenge            Assessment and Plan / Recommendations:  1) COPD (GOLD 4) - PFT 9/3/24 with severe obstruction, FEV1 30%, no BD response; was started on Wixela at last visit and had pharmacy referral for financial assistance for inhalers, he was started on Incruse and didn't realize he needed to continue Wixela so only using Incruse and having SOB  - educated to use Wixela 1 puff twice daily (rinse/spit) and Incruse 1 puff once daily  - ordered duonebs in place of albuterol nebs, advised duoneb or albuterol MDI q4-6hr  PRN SOB or wheezing  - ordered pulmonary rehab    2) Cigarette nicotine dependence - current smoker, about 1 PPD since age 6, ~68 pack year history   - >5 minutes smoking cessation counseling   - offered pharmacologic options to assist with quitting: ordered nicotine patches and nicotine lozenges    3) Lung nodules - LDCT 8/8/24 with 10 mm irregular nodular density at R apex, new 9 x 5 mm subpleural RUL nodule, 8 mm RLL nodule  - had already reviewed results with patient via telephone and ordered repeat LDCT to be done in 3 months from previous, he has it scheduled for 11/22/24      RTC 2 months with Fidelia Wilks CNP  Patient aware I am leaving and will no longer be seeing patients so he should follow-up with different pulm provider

## 2024-10-14 PROCEDURE — RXMED WILLOW AMBULATORY MEDICATION CHARGE

## 2024-10-16 ENCOUNTER — PHARMACY VISIT (OUTPATIENT)
Dept: PHARMACY | Facility: CLINIC | Age: 74
End: 2024-10-16
Payer: MEDICARE

## 2024-10-17 DIAGNOSIS — J44.9 COPD, MODERATE (MULTI): ICD-10-CM

## 2024-10-17 RX ORDER — ALBUTEROL SULFATE 90 UG/1
2 AEROSOL, METERED RESPIRATORY (INHALATION) EVERY 4 HOURS PRN
Qty: 18 G | Refills: 11 | Status: SHIPPED | OUTPATIENT
Start: 2024-10-17

## 2024-10-29 ENCOUNTER — APPOINTMENT (OUTPATIENT)
Dept: CARDIOLOGY | Facility: HOSPITAL | Age: 74
End: 2024-10-29
Payer: MEDICARE

## 2024-10-29 ENCOUNTER — APPOINTMENT (OUTPATIENT)
Dept: RADIOLOGY | Facility: HOSPITAL | Age: 74
End: 2024-10-29
Payer: MEDICARE

## 2024-10-29 ENCOUNTER — HOSPITAL ENCOUNTER (OUTPATIENT)
Facility: HOSPITAL | Age: 74
Setting detail: OBSERVATION
Discharge: HOME | End: 2024-10-30
Attending: STUDENT IN AN ORGANIZED HEALTH CARE EDUCATION/TRAINING PROGRAM | Admitting: INTERNAL MEDICINE
Payer: MEDICARE

## 2024-10-29 DIAGNOSIS — E87.29 ALCOHOLIC KETOACIDOSIS: Primary | ICD-10-CM

## 2024-10-29 LAB
ABO GROUP (TYPE) IN BLOOD: NORMAL
ALBUMIN SERPL BCP-MCNC: 3.9 G/DL (ref 3.4–5)
ALP SERPL-CCNC: 77 U/L (ref 33–136)
ALT SERPL W P-5'-P-CCNC: 14 U/L (ref 10–52)
ANION GAP BLDV CALCULATED.4IONS-SCNC: 18 MMOL/L (ref 10–25)
ANION GAP SERPL CALC-SCNC: 20 MMOL/L (ref 10–20)
ANTIBODY SCREEN: NORMAL
APPEARANCE UR: CLEAR
AST SERPL W P-5'-P-CCNC: 34 U/L (ref 9–39)
ATRIAL RATE: 100 BPM
B-OH-BUTYR SERPL-SCNC: 1.28 MMOL/L (ref 0.02–0.27)
BASE EXCESS BLDV CALC-SCNC: -8.6 MMOL/L (ref -2–3)
BASOPHILS # BLD AUTO: 0.02 X10*3/UL (ref 0–0.1)
BASOPHILS NFR BLD AUTO: 0.2 %
BILIRUB SERPL-MCNC: 0.3 MG/DL (ref 0–1.2)
BILIRUB UR STRIP.AUTO-MCNC: NEGATIVE MG/DL
BNP SERPL-MCNC: 32 PG/ML (ref 0–99)
BODY TEMPERATURE: 37 DEGREES CELSIUS
BUN SERPL-MCNC: 21 MG/DL (ref 6–23)
CA-I BLDV-SCNC: 1.02 MMOL/L (ref 1.1–1.33)
CALCIUM SERPL-MCNC: 8.2 MG/DL (ref 8.6–10.3)
CARDIAC TROPONIN I PNL SERPL HS: 5 NG/L (ref 0–20)
CARDIAC TROPONIN I PNL SERPL HS: 5 NG/L (ref 0–20)
CHLORIDE BLDV-SCNC: 106 MMOL/L (ref 98–107)
CHLORIDE SERPL-SCNC: 102 MMOL/L (ref 98–107)
CO2 SERPL-SCNC: 19 MMOL/L (ref 21–32)
COLOR UR: YELLOW
CREAT SERPL-MCNC: 0.97 MG/DL (ref 0.5–1.3)
EGFRCR SERPLBLD CKD-EPI 2021: 82 ML/MIN/1.73M*2
EOSINOPHIL # BLD AUTO: 0.01 X10*3/UL (ref 0–0.4)
EOSINOPHIL NFR BLD AUTO: 0.1 %
ERYTHROCYTE [DISTWIDTH] IN BLOOD BY AUTOMATED COUNT: 12.9 % (ref 11.5–14.5)
ETHANOL SERPL-MCNC: 83 MG/DL
GLUCOSE BLD MANUAL STRIP-MCNC: 108 MG/DL (ref 74–99)
GLUCOSE BLD MANUAL STRIP-MCNC: 139 MG/DL (ref 74–99)
GLUCOSE BLD MANUAL STRIP-MCNC: 213 MG/DL (ref 74–99)
GLUCOSE BLD MANUAL STRIP-MCNC: 271 MG/DL (ref 74–99)
GLUCOSE BLD MANUAL STRIP-MCNC: 73 MG/DL (ref 74–99)
GLUCOSE BLD MANUAL STRIP-MCNC: 90 MG/DL (ref 74–99)
GLUCOSE BLDV-MCNC: 56 MG/DL (ref 74–99)
GLUCOSE SERPL-MCNC: 96 MG/DL (ref 74–99)
GLUCOSE UR STRIP.AUTO-MCNC: NORMAL MG/DL
HCO3 BLDV-SCNC: 17.4 MMOL/L (ref 22–26)
HCT VFR BLD AUTO: 35.6 % (ref 41–52)
HCT VFR BLD EST: 34 % (ref 41–52)
HGB BLD-MCNC: 11.9 G/DL (ref 13.5–17.5)
HGB BLDV-MCNC: 11.2 G/DL (ref 13.5–17.5)
HOLD SPECIMEN: NORMAL
IMM GRANULOCYTES # BLD AUTO: 0.07 X10*3/UL (ref 0–0.5)
IMM GRANULOCYTES NFR BLD AUTO: 0.7 % (ref 0–0.9)
INHALED O2 CONCENTRATION: 21 %
KETONES UR STRIP.AUTO-MCNC: ABNORMAL MG/DL
LACTATE BLDV-SCNC: 2.2 MMOL/L (ref 0.4–2)
LACTATE BLDV-SCNC: 4.7 MMOL/L (ref 0.4–2)
LACTATE SERPL-SCNC: 1.6 MMOL/L (ref 0.4–2)
LACTATE SERPL-SCNC: 4.8 MMOL/L (ref 0.4–2)
LACTATE SERPL-SCNC: 5.3 MMOL/L (ref 0.4–2)
LACTATE SERPL-SCNC: 5.5 MMOL/L (ref 0.4–2)
LEUKOCYTE ESTERASE UR QL STRIP.AUTO: NEGATIVE
LYMPHOCYTES # BLD AUTO: 0.72 X10*3/UL (ref 0.8–3)
LYMPHOCYTES NFR BLD AUTO: 7.1 %
MAGNESIUM SERPL-MCNC: 1.72 MG/DL (ref 1.6–2.4)
MCH RBC QN AUTO: 31.7 PG (ref 26–34)
MCHC RBC AUTO-ENTMCNC: 33.4 G/DL (ref 32–36)
MCV RBC AUTO: 95 FL (ref 80–100)
MONOCYTES # BLD AUTO: 0.3 X10*3/UL (ref 0.05–0.8)
MONOCYTES NFR BLD AUTO: 2.9 %
NEUTROPHILS # BLD AUTO: 9.08 X10*3/UL (ref 1.6–5.5)
NEUTROPHILS NFR BLD AUTO: 89 %
NITRITE UR QL STRIP.AUTO: NEGATIVE
NRBC BLD-RTO: 0 /100 WBCS (ref 0–0)
OXYHGB MFR BLDV: 73.2 % (ref 45–75)
P AXIS: 81 DEGREES
PCO2 BLDV: 37 MM HG (ref 41–51)
PH BLDV: 7.28 PH (ref 7.33–7.43)
PH UR STRIP.AUTO: 5 [PH]
PLATELET # BLD AUTO: 292 X10*3/UL (ref 150–450)
PO2 BLDV: 46 MM HG (ref 35–45)
POTASSIUM BLDV-SCNC: 4 MMOL/L (ref 3.5–5.3)
POTASSIUM SERPL-SCNC: 4.3 MMOL/L (ref 3.5–5.3)
PR INTERVAL: 136 MS
PROT SERPL-MCNC: 6.5 G/DL (ref 6.4–8.2)
PROT UR STRIP.AUTO-MCNC: NEGATIVE MG/DL
Q ONSET: 252 MS
QRS COUNT: 16 BEATS
QRS DURATION: 95 MS
QT INTERVAL: 356 MS
QTC CALCULATION(BAZETT): 457 MS
QTC FREDERICIA: 420 MS
R AXIS: 67 DEGREES
RBC # BLD AUTO: 3.75 X10*6/UL (ref 4.5–5.9)
RBC # UR STRIP.AUTO: NEGATIVE /UL
RH FACTOR (ANTIGEN D): NORMAL
SAO2 % BLDV: 76 % (ref 45–75)
SODIUM BLDV-SCNC: 137 MMOL/L (ref 136–145)
SODIUM SERPL-SCNC: 137 MMOL/L (ref 136–145)
SP GR UR STRIP.AUTO: 1.02
T AXIS: 45 DEGREES
T OFFSET: 430 MS
UROBILINOGEN UR STRIP.AUTO-MCNC: NORMAL MG/DL
VENTRICULAR RATE: 99 BPM
WBC # BLD AUTO: 10.2 X10*3/UL (ref 4.4–11.3)

## 2024-10-29 PROCEDURE — 36415 COLL VENOUS BLD VENIPUNCTURE: CPT | Performed by: NURSE PRACTITIONER

## 2024-10-29 PROCEDURE — 82077 ASSAY SPEC XCP UR&BREATH IA: CPT | Performed by: NURSE PRACTITIONER

## 2024-10-29 PROCEDURE — 94640 AIRWAY INHALATION TREATMENT: CPT | Mod: MUE

## 2024-10-29 PROCEDURE — 82947 ASSAY GLUCOSE BLOOD QUANT: CPT | Mod: 59

## 2024-10-29 PROCEDURE — 84484 ASSAY OF TROPONIN QUANT: CPT | Performed by: NURSE PRACTITIONER

## 2024-10-29 PROCEDURE — 99223 1ST HOSP IP/OBS HIGH 75: CPT | Performed by: NURSE PRACTITIONER

## 2024-10-29 PROCEDURE — 82010 KETONE BODYS QUAN: CPT | Performed by: NURSE PRACTITIONER

## 2024-10-29 PROCEDURE — 71045 X-RAY EXAM CHEST 1 VIEW: CPT

## 2024-10-29 PROCEDURE — G0378 HOSPITAL OBSERVATION PER HR: HCPCS

## 2024-10-29 PROCEDURE — 2500000001 HC RX 250 WO HCPCS SELF ADMINISTERED DRUGS (ALT 637 FOR MEDICARE OP): Performed by: NURSE PRACTITIONER

## 2024-10-29 PROCEDURE — 85025 COMPLETE CBC W/AUTO DIFF WBC: CPT | Performed by: STUDENT IN AN ORGANIZED HEALTH CARE EDUCATION/TRAINING PROGRAM

## 2024-10-29 PROCEDURE — 83605 ASSAY OF LACTIC ACID: CPT | Performed by: NURSE PRACTITIONER

## 2024-10-29 PROCEDURE — 82947 ASSAY GLUCOSE BLOOD QUANT: CPT | Mod: 59,MUE

## 2024-10-29 PROCEDURE — 80053 COMPREHEN METABOLIC PANEL: CPT | Performed by: NURSE PRACTITIONER

## 2024-10-29 PROCEDURE — 96374 THER/PROPH/DIAG INJ IV PUSH: CPT

## 2024-10-29 PROCEDURE — 83735 ASSAY OF MAGNESIUM: CPT | Performed by: NURSE PRACTITIONER

## 2024-10-29 PROCEDURE — 80053 COMPREHEN METABOLIC PANEL: CPT | Performed by: STUDENT IN AN ORGANIZED HEALTH CARE EDUCATION/TRAINING PROGRAM

## 2024-10-29 PROCEDURE — 94640 AIRWAY INHALATION TREATMENT: CPT

## 2024-10-29 PROCEDURE — 96372 THER/PROPH/DIAG INJ SC/IM: CPT | Mod: 59 | Performed by: NURSE PRACTITIONER

## 2024-10-29 PROCEDURE — S4991 NICOTINE PATCH NONLEGEND: HCPCS | Performed by: NURSE PRACTITIONER

## 2024-10-29 PROCEDURE — 99291 CRITICAL CARE FIRST HOUR: CPT

## 2024-10-29 PROCEDURE — 86901 BLOOD TYPING SEROLOGIC RH(D): CPT | Performed by: NURSE PRACTITIONER

## 2024-10-29 PROCEDURE — 2500000002 HC RX 250 W HCPCS SELF ADMINISTERED DRUGS (ALT 637 FOR MEDICARE OP, ALT 636 FOR OP/ED): Mod: MUE | Performed by: NURSE PRACTITIONER

## 2024-10-29 PROCEDURE — 2500000002 HC RX 250 W HCPCS SELF ADMINISTERED DRUGS (ALT 637 FOR MEDICARE OP, ALT 636 FOR OP/ED): Performed by: NURSE PRACTITIONER

## 2024-10-29 PROCEDURE — 93005 ELECTROCARDIOGRAM TRACING: CPT

## 2024-10-29 PROCEDURE — 2500000004 HC RX 250 GENERAL PHARMACY W/ HCPCS (ALT 636 FOR OP/ED): Performed by: NURSE PRACTITIONER

## 2024-10-29 PROCEDURE — 82947 ASSAY GLUCOSE BLOOD QUANT: CPT

## 2024-10-29 PROCEDURE — 83735 ASSAY OF MAGNESIUM: CPT | Performed by: STUDENT IN AN ORGANIZED HEALTH CARE EDUCATION/TRAINING PROGRAM

## 2024-10-29 PROCEDURE — 96376 TX/PRO/DX INJ SAME DRUG ADON: CPT

## 2024-10-29 PROCEDURE — 96375 TX/PRO/DX INJ NEW DRUG ADDON: CPT

## 2024-10-29 PROCEDURE — 83605 ASSAY OF LACTIC ACID: CPT | Mod: MUE | Performed by: NURSE PRACTITIONER

## 2024-10-29 PROCEDURE — 85025 COMPLETE CBC W/AUTO DIFF WBC: CPT | Performed by: NURSE PRACTITIONER

## 2024-10-29 PROCEDURE — 81003 URINALYSIS AUTO W/O SCOPE: CPT | Performed by: NURSE PRACTITIONER

## 2024-10-29 PROCEDURE — 96361 HYDRATE IV INFUSION ADD-ON: CPT

## 2024-10-29 PROCEDURE — 84100 ASSAY OF PHOSPHORUS: CPT | Performed by: INTERNAL MEDICINE

## 2024-10-29 PROCEDURE — 83880 ASSAY OF NATRIURETIC PEPTIDE: CPT | Performed by: NURSE PRACTITIONER

## 2024-10-29 PROCEDURE — 87040 BLOOD CULTURE FOR BACTERIA: CPT | Mod: PORLAB | Performed by: NURSE PRACTITIONER

## 2024-10-29 PROCEDURE — 82077 ASSAY SPEC XCP UR&BREATH IA: CPT | Performed by: STUDENT IN AN ORGANIZED HEALTH CARE EDUCATION/TRAINING PROGRAM

## 2024-10-29 PROCEDURE — 83605 ASSAY OF LACTIC ACID: CPT | Performed by: STUDENT IN AN ORGANIZED HEALTH CARE EDUCATION/TRAINING PROGRAM

## 2024-10-29 PROCEDURE — 71045 X-RAY EXAM CHEST 1 VIEW: CPT | Mod: FOREIGN READ | Performed by: RADIOLOGY

## 2024-10-29 RX ORDER — ALBUTEROL SULFATE 90 UG/1
2 INHALANT RESPIRATORY (INHALATION) EVERY 4 HOURS PRN
Status: DISCONTINUED | OUTPATIENT
Start: 2024-10-29 | End: 2024-10-30 | Stop reason: HOSPADM

## 2024-10-29 RX ORDER — IBUPROFEN 200 MG
1 TABLET ORAL DAILY
Status: DISCONTINUED | OUTPATIENT
Start: 2024-10-29 | End: 2024-10-30 | Stop reason: HOSPADM

## 2024-10-29 RX ORDER — GUAIFENESIN 600 MG/1
600 TABLET, EXTENDED RELEASE ORAL EVERY 12 HOURS PRN
Status: DISCONTINUED | OUTPATIENT
Start: 2024-10-29 | End: 2024-10-30 | Stop reason: HOSPADM

## 2024-10-29 RX ORDER — IPRATROPIUM BROMIDE AND ALBUTEROL SULFATE 2.5; .5 MG/3ML; MG/3ML
3 SOLUTION RESPIRATORY (INHALATION)
Status: DISCONTINUED | OUTPATIENT
Start: 2024-10-29 | End: 2024-10-29

## 2024-10-29 RX ORDER — LORAZEPAM 1 MG/1
1 TABLET ORAL EVERY 2 HOUR PRN
Status: DISCONTINUED | OUTPATIENT
Start: 2024-10-29 | End: 2024-10-30 | Stop reason: HOSPADM

## 2024-10-29 RX ORDER — FERROUS GLUCONATE 325 MG
38 TABLET ORAL EVERY OTHER DAY
Status: DISCONTINUED | OUTPATIENT
Start: 2024-10-30 | End: 2024-10-30 | Stop reason: HOSPADM

## 2024-10-29 RX ORDER — THIAMINE HYDROCHLORIDE 100 MG/ML
500 INJECTION, SOLUTION INTRAMUSCULAR; INTRAVENOUS ONCE
Status: COMPLETED | OUTPATIENT
Start: 2024-10-29 | End: 2024-10-29

## 2024-10-29 RX ORDER — PANTOPRAZOLE SODIUM 40 MG/1
40 TABLET, DELAYED RELEASE ORAL
Status: DISCONTINUED | OUTPATIENT
Start: 2024-10-30 | End: 2024-10-30 | Stop reason: HOSPADM

## 2024-10-29 RX ORDER — PANTOPRAZOLE SODIUM 40 MG/10ML
40 INJECTION, POWDER, LYOPHILIZED, FOR SOLUTION INTRAVENOUS
Status: DISCONTINUED | OUTPATIENT
Start: 2024-10-30 | End: 2024-10-30 | Stop reason: HOSPADM

## 2024-10-29 RX ORDER — ONDANSETRON HYDROCHLORIDE 2 MG/ML
4 INJECTION, SOLUTION INTRAVENOUS EVERY 8 HOURS PRN
Status: DISCONTINUED | OUTPATIENT
Start: 2024-10-29 | End: 2024-10-30 | Stop reason: HOSPADM

## 2024-10-29 RX ORDER — DEXTROSE 50 % IN WATER (D50W) INTRAVENOUS SYRINGE
25
Status: DISCONTINUED | OUTPATIENT
Start: 2024-10-29 | End: 2024-10-30 | Stop reason: HOSPADM

## 2024-10-29 RX ORDER — FOLIC ACID 1 MG/1
1 TABLET ORAL DAILY
Status: DISCONTINUED | OUTPATIENT
Start: 2024-10-29 | End: 2024-10-30 | Stop reason: HOSPADM

## 2024-10-29 RX ORDER — ENOXAPARIN SODIUM 100 MG/ML
40 INJECTION SUBCUTANEOUS EVERY 24 HOURS
Status: DISCONTINUED | OUTPATIENT
Start: 2024-10-29 | End: 2024-10-30 | Stop reason: HOSPADM

## 2024-10-29 RX ORDER — IPRATROPIUM BROMIDE AND ALBUTEROL SULFATE 2.5; .5 MG/3ML; MG/3ML
3 SOLUTION RESPIRATORY (INHALATION) EVERY 2 HOUR PRN
Status: DISCONTINUED | OUTPATIENT
Start: 2024-10-29 | End: 2024-10-30 | Stop reason: HOSPADM

## 2024-10-29 RX ORDER — FLUTICASONE FUROATE AND VILANTEROL 200; 25 UG/1; UG/1
1 POWDER RESPIRATORY (INHALATION)
Status: DISCONTINUED | OUTPATIENT
Start: 2024-10-29 | End: 2024-10-30 | Stop reason: HOSPADM

## 2024-10-29 RX ORDER — ONDANSETRON HYDROCHLORIDE 2 MG/ML
4 INJECTION, SOLUTION INTRAVENOUS ONCE
Status: COMPLETED | OUTPATIENT
Start: 2024-10-29 | End: 2024-10-29

## 2024-10-29 RX ORDER — AMLODIPINE BESYLATE 5 MG/1
5 TABLET ORAL DAILY
Status: DISCONTINUED | OUTPATIENT
Start: 2024-10-29 | End: 2024-10-30 | Stop reason: HOSPADM

## 2024-10-29 RX ORDER — LORAZEPAM 0.5 MG/1
0.5 TABLET ORAL EVERY 2 HOUR PRN
Status: DISCONTINUED | OUTPATIENT
Start: 2024-10-29 | End: 2024-10-30 | Stop reason: HOSPADM

## 2024-10-29 RX ORDER — TAMSULOSIN HYDROCHLORIDE 0.4 MG/1
0.4 CAPSULE ORAL DAILY
Status: DISCONTINUED | OUTPATIENT
Start: 2024-10-29 | End: 2024-10-30 | Stop reason: HOSPADM

## 2024-10-29 RX ORDER — TALC
3 POWDER (GRAM) TOPICAL NIGHTLY PRN
Status: DISCONTINUED | OUTPATIENT
Start: 2024-10-29 | End: 2024-10-30 | Stop reason: HOSPADM

## 2024-10-29 RX ORDER — DEXTROSE 50 % IN WATER (D50W) INTRAVENOUS SYRINGE
12.5
Status: DISCONTINUED | OUTPATIENT
Start: 2024-10-29 | End: 2024-10-30 | Stop reason: HOSPADM

## 2024-10-29 RX ORDER — BISACODYL 10 MG/1
10 SUPPOSITORY RECTAL DAILY PRN
Status: DISCONTINUED | OUTPATIENT
Start: 2024-10-29 | End: 2024-10-30 | Stop reason: HOSPADM

## 2024-10-29 RX ORDER — LANOLIN ALCOHOL/MO/W.PET/CERES
100 CREAM (GRAM) TOPICAL DAILY
Status: DISCONTINUED | OUTPATIENT
Start: 2024-11-01 | End: 2024-10-30 | Stop reason: HOSPADM

## 2024-10-29 RX ORDER — ONDANSETRON 4 MG/1
4 TABLET, ORALLY DISINTEGRATING ORAL EVERY 8 HOURS PRN
Status: DISCONTINUED | OUTPATIENT
Start: 2024-10-29 | End: 2024-10-30 | Stop reason: HOSPADM

## 2024-10-29 RX ORDER — DEXTROSE MONOHYDRATE AND SODIUM CHLORIDE 5; .9 G/100ML; G/100ML
100 INJECTION, SOLUTION INTRAVENOUS CONTINUOUS
Status: DISCONTINUED | OUTPATIENT
Start: 2024-10-29 | End: 2024-10-29

## 2024-10-29 RX ORDER — MULTIVIT-MIN/IRON FUM/FOLIC AC 7.5 MG-4
1 TABLET ORAL DAILY
Status: DISCONTINUED | OUTPATIENT
Start: 2024-10-29 | End: 2024-10-30 | Stop reason: HOSPADM

## 2024-10-29 RX ORDER — BISACODYL 5 MG
10 TABLET, DELAYED RELEASE (ENTERIC COATED) ORAL DAILY PRN
Status: DISCONTINUED | OUTPATIENT
Start: 2024-10-29 | End: 2024-10-30 | Stop reason: HOSPADM

## 2024-10-29 RX ORDER — LORAZEPAM 1 MG/1
2 TABLET ORAL EVERY 2 HOUR PRN
Status: DISCONTINUED | OUTPATIENT
Start: 2024-10-29 | End: 2024-10-30 | Stop reason: HOSPADM

## 2024-10-29 RX ORDER — IPRATROPIUM BROMIDE AND ALBUTEROL SULFATE 2.5; .5 MG/3ML; MG/3ML
3 SOLUTION RESPIRATORY (INHALATION) ONCE
Status: COMPLETED | OUTPATIENT
Start: 2024-10-29 | End: 2024-10-29

## 2024-10-29 RX ORDER — THIAMINE HYDROCHLORIDE 100 MG/ML
100 INJECTION, SOLUTION INTRAMUSCULAR; INTRAVENOUS DAILY
Status: DISCONTINUED | OUTPATIENT
Start: 2024-10-29 | End: 2024-10-30

## 2024-10-29 RX ORDER — POLYETHYLENE GLYCOL 3350 17 G/17G
17 POWDER, FOR SOLUTION ORAL DAILY
Status: DISCONTINUED | OUTPATIENT
Start: 2024-10-29 | End: 2024-10-30 | Stop reason: HOSPADM

## 2024-10-29 SDOH — ECONOMIC STABILITY: HOUSING INSECURITY: IN THE PAST 12 MONTHS, HOW MANY TIMES HAVE YOU MOVED WHERE YOU WERE LIVING?: 0

## 2024-10-29 SDOH — SOCIAL STABILITY: SOCIAL INSECURITY: WITHIN THE LAST YEAR, HAVE YOU BEEN HUMILIATED OR EMOTIONALLY ABUSED IN OTHER WAYS BY YOUR PARTNER OR EX-PARTNER?: NO

## 2024-10-29 SDOH — SOCIAL STABILITY: SOCIAL INSECURITY
WITHIN THE LAST YEAR, HAVE YOU BEEN RAPED OR FORCED TO HAVE ANY KIND OF SEXUAL ACTIVITY BY YOUR PARTNER OR EX-PARTNER?: NO

## 2024-10-29 SDOH — ECONOMIC STABILITY: TRANSPORTATION INSECURITY: IN THE PAST 12 MONTHS, HAS LACK OF TRANSPORTATION KEPT YOU FROM MEDICAL APPOINTMENTS OR FROM GETTING MEDICATIONS?: NO

## 2024-10-29 SDOH — ECONOMIC STABILITY: INCOME INSECURITY: IN THE PAST 12 MONTHS HAS THE ELECTRIC, GAS, OIL, OR WATER COMPANY THREATENED TO SHUT OFF SERVICES IN YOUR HOME?: NO

## 2024-10-29 SDOH — ECONOMIC STABILITY: HOUSING INSECURITY: AT ANY TIME IN THE PAST 12 MONTHS, WERE YOU HOMELESS OR LIVING IN A SHELTER (INCLUDING NOW)?: NO

## 2024-10-29 SDOH — ECONOMIC STABILITY: FOOD INSECURITY: WITHIN THE PAST 12 MONTHS, YOU WORRIED THAT YOUR FOOD WOULD RUN OUT BEFORE YOU GOT THE MONEY TO BUY MORE.: NEVER TRUE

## 2024-10-29 SDOH — SOCIAL STABILITY: SOCIAL INSECURITY
WITHIN THE LAST YEAR, HAVE YOU BEEN KICKED, HIT, SLAPPED, OR OTHERWISE PHYSICALLY HURT BY YOUR PARTNER OR EX-PARTNER?: NO

## 2024-10-29 SDOH — SOCIAL STABILITY: SOCIAL INSECURITY: WITHIN THE LAST YEAR, HAVE YOU BEEN AFRAID OF YOUR PARTNER OR EX-PARTNER?: NO

## 2024-10-29 SDOH — ECONOMIC STABILITY: FOOD INSECURITY: HOW HARD IS IT FOR YOU TO PAY FOR THE VERY BASICS LIKE FOOD, HOUSING, MEDICAL CARE, AND HEATING?: NOT HARD AT ALL

## 2024-10-29 SDOH — ECONOMIC STABILITY: HOUSING INSECURITY: IN THE LAST 12 MONTHS, WAS THERE A TIME WHEN YOU WERE NOT ABLE TO PAY THE MORTGAGE OR RENT ON TIME?: NO

## 2024-10-29 SDOH — SOCIAL STABILITY: SOCIAL INSECURITY: HAVE YOU HAD THOUGHTS OF HARMING ANYONE ELSE?: NO

## 2024-10-29 SDOH — SOCIAL STABILITY: SOCIAL INSECURITY: WERE YOU ABLE TO COMPLETE ALL THE BEHAVIORAL HEALTH SCREENINGS?: YES

## 2024-10-29 SDOH — ECONOMIC STABILITY: FOOD INSECURITY: WITHIN THE PAST 12 MONTHS, THE FOOD YOU BOUGHT JUST DIDN'T LAST AND YOU DIDN'T HAVE MONEY TO GET MORE.: NEVER TRUE

## 2024-10-29 ASSESSMENT — ENCOUNTER SYMPTOMS
SHORTNESS OF BREATH: 0
LIGHT-HEADEDNESS: 0
SEIZURES: 0
FLANK PAIN: 0
DIFFICULTY URINATING: 0
CHOKING: 0
NECK STIFFNESS: 0
ARTHRALGIAS: 0
VOICE CHANGE: 0
DYSURIA: 0
MYALGIAS: 0
CHEST TIGHTNESS: 0
NECK PAIN: 0
CHILLS: 0
COUGH: 0
EYE DISCHARGE: 0
FREQUENCY: 0
POLYDIPSIA: 0
BRUISES/BLEEDS EASILY: 0
WHEEZING: 0
NAUSEA: 0
HALLUCINATIONS: 0
FEVER: 0
ABDOMINAL PAIN: 0
SORE THROAT: 0
COLOR CHANGE: 0
CONSTIPATION: 0
WEAKNESS: 0
WOUND: 0
PALPITATIONS: 0
APNEA: 0
VOMITING: 0
EYE PAIN: 0
NUMBNESS: 0
PHOTOPHOBIA: 0
CONFUSION: 0
TREMORS: 0
TROUBLE SWALLOWING: 0
SPEECH DIFFICULTY: 0
SINUS PAIN: 0
EYE ITCHING: 0
HEADACHES: 0
UNEXPECTED WEIGHT CHANGE: 0
BACK PAIN: 0
NERVOUS/ANXIOUS: 0
FATIGUE: 1
APPETITE CHANGE: 1
ABDOMINAL DISTENTION: 0
HEMATURIA: 0
DIARRHEA: 0
ADENOPATHY: 0
DIZZINESS: 0
POLYPHAGIA: 0
BLOOD IN STOOL: 0
SLEEP DISTURBANCE: 0
DYSPHORIC MOOD: 0

## 2024-10-29 ASSESSMENT — LIFESTYLE VARIABLES
HOW MANY STANDARD DRINKS CONTAINING ALCOHOL DO YOU HAVE ON A TYPICAL DAY: 7 TO 9
AUDITORY DISTURBANCES: NOT PRESENT
HEADACHE, FULLNESS IN HEAD: NOT PRESENT
ANXIETY: NO ANXIETY, AT EASE
TREMOR: NO TREMOR
TOTAL SCORE: 0
TOTAL SCORE: 0
HOW OFTEN DO YOU HAVE A DRINK CONTAINING ALCOHOL: 2-3 TIMES A WEEK
TOTAL SCORE: 0
PAROXYSMAL SWEATS: NO SWEAT VISIBLE
HAVE YOU EVER FELT YOU SHOULD CUT DOWN ON YOUR DRINKING: NO
PAROXYSMAL SWEATS: NO SWEAT VISIBLE
HAVE PEOPLE ANNOYED YOU BY CRITICIZING YOUR DRINKING: NO
AUDITORY DISTURBANCES: NOT PRESENT
PULSE: 96
EVER FELT BAD OR GUILTY ABOUT YOUR DRINKING: NO
NAUSEA AND VOMITING: NO NAUSEA AND NO VOMITING
HOW OFTEN DURING THE LAST YEAR HAVE YOU FOUND THAT YOU WERE NOT ABLE TO STOP DRINKING ONCE YOU HAD STARTED: LESS THAN MONTHLY
HEADACHE, FULLNESS IN HEAD: NOT PRESENT
BLOOD PRESSURE: 128/63
SKIP TO QUESTIONS 9-10: 0
VISUAL DISTURBANCES: NOT PRESENT
PRESCIPTION_ABUSE_PAST_12_MONTHS: NO
HEADACHE, FULLNESS IN HEAD: NOT PRESENT
AUDIT-C TOTAL SCORE: 9
NAUSEA AND VOMITING: NO NAUSEA AND NO VOMITING
HOW OFTEN DURING THE LAST YEAR HAVE YOU NEEDED AN ALCOHOLIC DRINK FIRST THING IN THE MORNING TO GET YOURSELF GOING AFTER A NIGHT OF HEAVY DRINKING: LESS THAN MONTHLY
ANXIETY: NO ANXIETY, AT EASE
PAROXYSMAL SWEATS: NO SWEAT VISIBLE
NAUSEA AND VOMITING: NO NAUSEA AND NO VOMITING
VISUAL DISTURBANCES: NOT PRESENT
AUDIT TOTAL SCORE: 14
VISUAL DISTURBANCES: NOT PRESENT
NAUSEA AND VOMITING: NO NAUSEA AND NO VOMITING
PAROXYSMAL SWEATS: NO SWEAT VISIBLE
HOW OFTEN DURING THE LAST YEAR HAVE YOU HAD A FEELING OF GUILT OR REMORSE AFTER DRINKING: LESS THAN MONTHLY
SUBSTANCE_ABUSE_PAST_12_MONTHS: NO
TOTAL SCORE: 0
AUDITORY DISTURBANCES: NOT PRESENT
NAUSEA AND VOMITING: NO NAUSEA AND NO VOMITING
HAS A RELATIVE, FRIEND, DOCTOR, OR ANOTHER HEALTH PROFESSIONAL EXPRESSED CONCERN ABOUT YOUR DRINKING OR SUGGESTED YOU CUT DOWN: NO
HEADACHE, FULLNESS IN HEAD: NOT PRESENT
ANXIETY: NO ANXIETY, AT EASE
ORIENTATION AND CLOUDING OF SENSORIUM: ORIENTED AND CAN DO SERIAL ADDITIONS
TREMOR: NO TREMOR
ORIENTATION AND CLOUDING OF SENSORIUM: ORIENTED AND CAN DO SERIAL ADDITIONS
ANXIETY: NO ANXIETY, AT EASE
AGITATION: NORMAL ACTIVITY
ANXIETY: NO ANXIETY, AT EASE
HAVE YOU OR SOMEONE ELSE BEEN INJURED AS A RESULT OF YOUR DRINKING: NO
ORIENTATION AND CLOUDING OF SENSORIUM: ORIENTED AND CAN DO SERIAL ADDITIONS
ORIENTATION AND CLOUDING OF SENSORIUM: ORIENTED AND CAN DO SERIAL ADDITIONS
HOW OFTEN DURING THE LAST YEAR HAVE YOU BEEN UNABLE TO REMEMBER WHAT HAPPENED THE NIGHT BEFORE BECAUSE YOU HAD BEEN DRINKING: LESS THAN MONTHLY
VISUAL DISTURBANCES: NOT PRESENT
AGITATION: NORMAL ACTIVITY
AUDIT TOTAL SCORE: 5
HOW OFTEN DO YOU HAVE 6 OR MORE DRINKS ON ONE OCCASION: WEEKLY
AGITATION: NORMAL ACTIVITY
AGITATION: NORMAL ACTIVITY
AUDITORY DISTURBANCES: NOT PRESENT
PAROXYSMAL SWEATS: NO SWEAT VISIBLE
TOTAL SCORE: 0
AUDIT-C TOTAL SCORE: 9
ORIENTATION AND CLOUDING OF SENSORIUM: ORIENTED AND CAN DO SERIAL ADDITIONS
AUDITORY DISTURBANCES: NOT PRESENT
TREMOR: NO TREMOR
TOTAL SCORE: 0
VISUAL DISTURBANCES: NOT PRESENT
HEADACHE, FULLNESS IN HEAD: NOT PRESENT
TREMOR: NO TREMOR
TREMOR: NO TREMOR
AGITATION: NORMAL ACTIVITY
EVER HAD A DRINK FIRST THING IN THE MORNING TO STEADY YOUR NERVES TO GET RID OF A HANGOVER: NO
HOW OFTEN DURING THE LAST YEAR HAVE YOU FAILED TO DO WHAT WAS NORMALLY EXPECTED FROM YOU BECAUSE OF DRINKING: LESS THAN MONTHLY

## 2024-10-29 ASSESSMENT — COGNITIVE AND FUNCTIONAL STATUS - GENERAL
DAILY ACTIVITIY SCORE: 24
PATIENT BASELINE BEDBOUND: NO
MOBILITY SCORE: 24

## 2024-10-29 ASSESSMENT — ACTIVITIES OF DAILY LIVING (ADL)
JUDGMENT_ADEQUATE_SAFELY_COMPLETE_DAILY_ACTIVITIES: YES
GROOMING: INDEPENDENT
HEARING - LEFT EAR: FUNCTIONAL
TOILETING: INDEPENDENT
ASSISTIVE_DEVICE: DENTURES PARTIAL
WALKS IN HOME: INDEPENDENT
PATIENT'S MEMORY ADEQUATE TO SAFELY COMPLETE DAILY ACTIVITIES?: YES
FEEDING YOURSELF: INDEPENDENT
LACK_OF_TRANSPORTATION: NO
ADEQUATE_TO_COMPLETE_ADL: YES
LACK_OF_TRANSPORTATION: NO
LACK_OF_TRANSPORTATION: NO
DRESSING YOURSELF: INDEPENDENT
HEARING - RIGHT EAR: FUNCTIONAL
BATHING: INDEPENDENT

## 2024-10-29 ASSESSMENT — PAIN SCALES - GENERAL
PAINLEVEL_OUTOF10: 0 - NO PAIN
PAINLEVEL_OUTOF10: 0 - NO PAIN
PAINLEVEL_OUTOF10: 3
PAINLEVEL_OUTOF10: 0 - NO PAIN

## 2024-10-29 ASSESSMENT — PATIENT HEALTH QUESTIONNAIRE - PHQ9
SUM OF ALL RESPONSES TO PHQ9 QUESTIONS 1 & 2: 0
1. LITTLE INTEREST OR PLEASURE IN DOING THINGS: NOT AT ALL
2. FEELING DOWN, DEPRESSED OR HOPELESS: NOT AT ALL

## 2024-10-29 ASSESSMENT — COLUMBIA-SUICIDE SEVERITY RATING SCALE - C-SSRS
2. HAVE YOU ACTUALLY HAD ANY THOUGHTS OF KILLING YOURSELF?: NO
1. IN THE PAST MONTH, HAVE YOU WISHED YOU WERE DEAD OR WISHED YOU COULD GO TO SLEEP AND NOT WAKE UP?: NO
6. HAVE YOU EVER DONE ANYTHING, STARTED TO DO ANYTHING, OR PREPARED TO DO ANYTHING TO END YOUR LIFE?: NO

## 2024-10-29 ASSESSMENT — PAIN - FUNCTIONAL ASSESSMENT
PAIN_FUNCTIONAL_ASSESSMENT: 0-10
PAIN_FUNCTIONAL_ASSESSMENT: 0-10

## 2024-10-30 VITALS
TEMPERATURE: 97.1 F | BODY MASS INDEX: 15.85 KG/M2 | RESPIRATION RATE: 18 BRPM | WEIGHT: 117 LBS | HEART RATE: 89 BPM | HEIGHT: 72 IN | OXYGEN SATURATION: 95 % | DIASTOLIC BLOOD PRESSURE: 65 MMHG | SYSTOLIC BLOOD PRESSURE: 110 MMHG

## 2024-10-30 PROBLEM — E16.2 HYPOGLYCEMIA: Status: RESOLVED | Noted: 2023-05-05 | Resolved: 2024-10-30

## 2024-10-30 PROBLEM — E87.29 ALCOHOLIC KETOACIDOSIS: Status: RESOLVED | Noted: 2024-10-29 | Resolved: 2024-10-30

## 2024-10-30 LAB
GLUCOSE BLD MANUAL STRIP-MCNC: 103 MG/DL (ref 74–99)
GLUCOSE BLD MANUAL STRIP-MCNC: 136 MG/DL (ref 74–99)
GLUCOSE BLD MANUAL STRIP-MCNC: 97 MG/DL (ref 74–99)
PHOSPHATE SERPL-MCNC: 4.5 MG/DL (ref 2.5–4.9)

## 2024-10-30 PROCEDURE — G0378 HOSPITAL OBSERVATION PER HR: HCPCS

## 2024-10-30 PROCEDURE — 2500000001 HC RX 250 WO HCPCS SELF ADMINISTERED DRUGS (ALT 637 FOR MEDICARE OP): Performed by: NURSE PRACTITIONER

## 2024-10-30 PROCEDURE — 99239 HOSP IP/OBS DSCHRG MGMT >30: CPT | Performed by: INTERNAL MEDICINE

## 2024-10-30 PROCEDURE — 96376 TX/PRO/DX INJ SAME DRUG ADON: CPT

## 2024-10-30 PROCEDURE — 82947 ASSAY GLUCOSE BLOOD QUANT: CPT

## 2024-10-30 PROCEDURE — 2500000004 HC RX 250 GENERAL PHARMACY W/ HCPCS (ALT 636 FOR OP/ED): Performed by: NURSE PRACTITIONER

## 2024-10-30 PROCEDURE — 2500000002 HC RX 250 W HCPCS SELF ADMINISTERED DRUGS (ALT 637 FOR MEDICARE OP, ALT 636 FOR OP/ED): Performed by: NURSE PRACTITIONER

## 2024-10-30 PROCEDURE — S4991 NICOTINE PATCH NONLEGEND: HCPCS | Performed by: NURSE PRACTITIONER

## 2024-10-30 PROCEDURE — 97161 PT EVAL LOW COMPLEX 20 MIN: CPT | Mod: GP | Performed by: PHYSICAL THERAPIST

## 2024-10-30 PROCEDURE — 97166 OT EVAL MOD COMPLEX 45 MIN: CPT | Mod: GO

## 2024-10-30 ASSESSMENT — LIFESTYLE VARIABLES
PAROXYSMAL SWEATS: NO SWEAT VISIBLE
AUDITORY DISTURBANCES: NOT PRESENT
ANXIETY: NO ANXIETY, AT EASE
TREMOR: NO TREMOR
TREMOR: NO TREMOR
ANXIETY: NO ANXIETY, AT EASE
NAUSEA AND VOMITING: NO NAUSEA AND NO VOMITING
ORIENTATION AND CLOUDING OF SENSORIUM: ORIENTED AND CAN DO SERIAL ADDITIONS
HEADACHE, FULLNESS IN HEAD: NOT PRESENT
NAUSEA AND VOMITING: NO NAUSEA AND NO VOMITING
HEADACHE, FULLNESS IN HEAD: NOT PRESENT
ORIENTATION AND CLOUDING OF SENSORIUM: ORIENTED AND CAN DO SERIAL ADDITIONS
VISUAL DISTURBANCES: NOT PRESENT
PAROXYSMAL SWEATS: NO SWEAT VISIBLE
ORIENTATION AND CLOUDING OF SENSORIUM: ORIENTED AND CAN DO SERIAL ADDITIONS
AGITATION: NORMAL ACTIVITY
VISUAL DISTURBANCES: NOT PRESENT
VISUAL DISTURBANCES: NOT PRESENT
TREMOR: NO TREMOR
AGITATION: NORMAL ACTIVITY
AGITATION: NORMAL ACTIVITY
TOTAL SCORE: 0
PAROXYSMAL SWEATS: NO SWEAT VISIBLE
TOTAL SCORE: 0
NAUSEA AND VOMITING: NO NAUSEA AND NO VOMITING
ANXIETY: NO ANXIETY, AT EASE
TOTAL SCORE: 0
AUDITORY DISTURBANCES: NOT PRESENT
TOTAL SCORE: 0
VISUAL DISTURBANCES: NOT PRESENT
HEADACHE, FULLNESS IN HEAD: NOT PRESENT
NAUSEA AND VOMITING: NO NAUSEA AND NO VOMITING
AUDITORY DISTURBANCES: NOT PRESENT
PAROXYSMAL SWEATS: NO SWEAT VISIBLE
ORIENTATION AND CLOUDING OF SENSORIUM: ORIENTED AND CAN DO SERIAL ADDITIONS
ANXIETY: NO ANXIETY, AT EASE
HEADACHE, FULLNESS IN HEAD: NOT PRESENT
AGITATION: NORMAL ACTIVITY
TREMOR: NO TREMOR
AUDITORY DISTURBANCES: NOT PRESENT

## 2024-10-30 ASSESSMENT — COGNITIVE AND FUNCTIONAL STATUS - GENERAL
CLIMB 3 TO 5 STEPS WITH RAILING: A LITTLE
WALKING IN HOSPITAL ROOM: A LITTLE
DAILY ACTIVITIY SCORE: 24
DAILY ACTIVITIY SCORE: 24
MOBILITY SCORE: 24
MOBILITY SCORE: 22

## 2024-10-30 ASSESSMENT — PAIN - FUNCTIONAL ASSESSMENT
PAIN_FUNCTIONAL_ASSESSMENT: 0-10
PAIN_FUNCTIONAL_ASSESSMENT: 0-10

## 2024-10-30 ASSESSMENT — ACTIVITIES OF DAILY LIVING (ADL)
ADL_ASSISTANCE: INDEPENDENT
ADL_ASSISTANCE: INDEPENDENT
BATHING_ASSISTANCE: MODIFIED INDEPENDENT (DEVICE)

## 2024-10-30 ASSESSMENT — PAIN SCALES - GENERAL
PAINLEVEL_OUTOF10: 0 - NO PAIN
PAINLEVEL_OUTOF10: 0 - NO PAIN

## 2024-10-31 ENCOUNTER — PATIENT OUTREACH (OUTPATIENT)
Dept: PRIMARY CARE | Facility: CLINIC | Age: 74
End: 2024-10-31
Payer: MEDICARE

## 2024-11-02 LAB
BACTERIA BLD CULT: NORMAL
BACTERIA BLD CULT: NORMAL

## 2024-11-09 PROCEDURE — RXMED WILLOW AMBULATORY MEDICATION CHARGE

## 2024-11-10 LAB
ATRIAL RATE: 100 BPM
P AXIS: 81 DEGREES
PR INTERVAL: 136 MS
Q ONSET: 252 MS
QRS COUNT: 16 BEATS
QRS DURATION: 95 MS
QT INTERVAL: 356 MS
QTC CALCULATION(BAZETT): 457 MS
QTC FREDERICIA: 420 MS
R AXIS: 67 DEGREES
T AXIS: 45 DEGREES
T OFFSET: 430 MS
VENTRICULAR RATE: 99 BPM

## 2024-11-11 ENCOUNTER — CLINICAL SUPPORT (OUTPATIENT)
Dept: CARDIAC REHAB | Facility: HOSPITAL | Age: 74
End: 2024-11-11
Payer: MEDICARE

## 2024-11-11 VITALS
WEIGHT: 118 LBS | OXYGEN SATURATION: 93 % | SYSTOLIC BLOOD PRESSURE: 110 MMHG | BODY MASS INDEX: 15.98 KG/M2 | HEIGHT: 72 IN | DIASTOLIC BLOOD PRESSURE: 70 MMHG

## 2024-11-11 DIAGNOSIS — J44.9 STAGE 4 VERY SEVERE CHRONIC OBSTRUCTIVE PULMONARY DISEASE BY GLOBAL INITIATIVE FOR CHRONIC OBSTRUCTIVE LUNG DISEASE CLASSIFICATION (MULTI): ICD-10-CM

## 2024-11-11 ASSESSMENT — PATIENT HEALTH QUESTIONNAIRE - PHQ9
SUM OF ALL RESPONSES TO PHQ QUESTIONS 1-9: 0
8. MOVING OR SPEAKING SO SLOWLY THAT OTHER PEOPLE COULD HAVE NOTICED. OR THE OPPOSITE, BEING SO FIGETY OR RESTLESS THAT YOU HAVE BEEN MOVING AROUND A LOT MORE THAN USUAL: NOT AT ALL
SUM OF ALL RESPONSES TO PHQ9 QUESTIONS 1 & 2: 0
SUM OF ALL RESPONSES TO PHQ QUESTIONS 1-9: 0
3. TROUBLE FALLING OR STAYING ASLEEP OR SLEEPING TOO MUCH: NOT AT ALL
1. LITTLE INTEREST OR PLEASURE IN DOING THINGS: NOT AT ALL
7. TROUBLE CONCENTRATING ON THINGS, SUCH AS READING THE NEWSPAPER OR WATCHING TELEVISION: NOT AT ALL
2. FEELING DOWN, DEPRESSED OR HOPELESS: NOT AT ALL
5. POOR APPETITE OR OVEREATING: NOT AT ALL
6. FEELING BAD ABOUT YOURSELF - OR THAT YOU ARE A FAILURE OR HAVE LET YOURSELF OR YOUR FAMILY DOWN: NOT AT ALL
4. FEELING TIRED OR HAVING LITTLE ENERGY: NOT AT ALL
9. THOUGHTS THAT YOU WOULD BE BETTER OFF DEAD, OR OF HURTING YOURSELF: NOT AT ALL

## 2024-11-11 ASSESSMENT — ENCOUNTER SYMPTOMS
OCCASIONAL FEELINGS OF UNSTEADINESS: 0
DEPRESSION: 0
LOSS OF SENSATION IN FEET: 0

## 2024-11-11 NOTE — PROGRESS NOTES
Pulmonary Rehabilitation Initial Treatment Plan    Name: Verner L Blankenship  Medical Record Number: [id number]  YOB: 1950  Age: 74 y.o.    Today’s Date: 11/11/2024  Primary Care Physician: Kirsten Jay DO  Referring Physician: Tim Gil MD  Program Location: [facility] [address]  Primary Diagnosis:   1. Stage 4 very severe chronic obstructive pulmonary disease by Global Initiative for Chronic Obstructive Lung Disease classification (Multi)  Referral to Pulmonary Rehabilitation      Onset/Date of Diagnosis: 2010    Session #: Initial Assessment, not yet started program.    Falls Risk: Low  Psychosocial Assessment  Pre PHQ-9: 0  Post PHQ-9:   Sent PH-Q 9 to MD if score > 20: No; score < 20    Stress Management  Pt reported/currently experiencing stress: No  Patient uses stress management skills: Yes   History of: no history of anxiety or depression  Currently seeing a mental health provider: No  Social Support: Yes  Pre CAT score: 14  Post CAT score:   Learning Assessment:  Learning assessment/barriers: None  Preferred learning method: Auditory, Visual, and Reading handout  Barriers: None  Stages of Change:Action    Psychosocial Plan  Goal Status: Initial Assessment; goals not yet started  Psychosocial Goals: Decrease CAT score  Maintain PHQ-9 score.  Learn about living with chronic lung disease.  Learn to use stress management techniques.    Psychosocial Interventions/Education: Educate patient on how to live with chronic lung disease.  Question patient on new or existing stress/anxiety issues at least once every 30 days.    Oxygen Assessment  SpO2 at rest: 93 % on RA  SpO2 with exertion: 92 % on RA    Oxygen Use  Supplemental O2 prescribed: No,   Hypoxia managed: Yes   Home Pulse Oximeter: Yes     Pre MMRC: 3  Post MMRC:     Oxygen Plan  Goal Status: Initial Assessment; goals not yet started  Oxygen Goals: Decrease MMRC score  Learn and use diaphragmatic breathing as needed.  Learn and  use pursed lip breathing as needed.  Titrate supplemental O2 as needed to keep SpO2 at 88% or greater.    History of WANG?: No    Oxygen Education/Interventions: Learning to use pursed lip breathing during exercise to help lessen dyspnea.  SpO2 checks with pulse oximeter at rest and with each modality.  Supplemental O2 as needed.  Titrate supplemental O2 as needed to keep SpO2 at 88% or greater.    Nutrition Assessment:  Hyperlipidemia: No   Lipids:   Lab Results   Component Value Date    CHOL 119 03/16/2020    HDL 43.3 03/16/2020    LDLF 68 03/16/2020    TRIG 41 03/16/2020   Current Dietary Guidelines:  None  Barriers to dietary change: no    Diabetes Assessment  Lab Results   Component Value Date    HGBA1C 5.8 (H) 06/06/2024   History of Diabetes: No    Weight Management  Height: 182.9 cm (6')  Weight: 53.5 kg (118 lb)  BMI (Calculated): 16    Nutrition Plan  Goal Status: Initial Assessment; goals not yet started  Nutrition Goals: Learn about healthy eating habits.  Maintain current weight.    Nutrition Interventions/Education: Body weight checked weekly.  Discuss fat screener results and recommendations.    Exercise Assessment  Home Exercise: No  Mode: NA  Frequency: NA  Duration: NA    6 Minute Walk Assessment   6 MWT distance: 860 ft  FiO2 used during test: RA    Exercise Prescription  Exercise Prescription based on: 6 Minute Walk Test  6MWT: Yes  Pre Test O2 Sat/HR: 93/86  2 Minute O2 Sat/HR: 93/99  4 Minute O2 Sat/HR: 92/105  6 Minute O2 Sat/HR: 92/104  Distance Walked(ft): 860  Mely Dyspnea: 2.5  Mely PRE: 11.5  Post Test O2 Sat/HR: 93/90   Frequency:  2 days/week   Mode: Treadmill, Recumbent Cycle, Arm Ergometer, and SciFit   Duration: 20-40 total aerobic minutes   Intensity: RPE 11-13  Target HR:  Rest +30  MET Level: 2+  Patient wears supplemental O2: No   Modality Workload METs Duration (minutes)   1 Pre-Exercise      2 Scifit Stepper 1.0   5 :00   3 Recumbent Bike #1   5 :00   4 Arm Ergometer UEE 0W   5  :00   5 Treadmill 0.6/0   5 :00   6 Post-Exercise      Resistance Training: Yes   Home Exercise Prescription given: To be given prior to discharge from program.    Exercise Plan  Goal Status: Initial Assessment; goals not yet started  Exercise Goals: By discharge:  Increase exercise MET level by 5-10% each week  Increase total exercise duration to 30-45 minutes  Obtain 150 minutes/week of moderate intensity aerobic exercise  Initiate strength training 2-3 days a week  Initiate flexibility training 2-3 days a week  Establish a home exercise program before discharge    Exercise Interventions/Education: SPO2 goal is to add or titrate supplemnetal O2 as necessary at rest/exercise via nasal cannula or mask to prevent <88% SPO2.  Target goal for duration: 30-45 minutes; Increase MET level 5-10% each week or as tolerated.    Physical exercise restrictions: None    Other Core Components/Risk Factor Assessment:  Medication adherence  Current Medications:   Current Outpatient Medications on File Prior to Visit   Medication Sig Dispense Refill    albuterol (Ventolin HFA) 90 mcg/actuation inhaler inhale 2 puffs by mouth every 4 hours as needed for shortness of breath 18 g 11    amLODIPine (Norvasc) 5 mg tablet Take 1 tablet (5 mg) by mouth once daily. 90 tablet 3    ferrous gluconate 236 mg (27 mg iron) tablet Take 1 tablet (27 mg) by mouth every other day.      ipratropium-albuteroL (Duo-Neb) 0.5-2.5 mg/3 mL nebulizer solution Take 3 mL by nebulization every 4 hours if needed for shortness of breath or wheezing. 360 mL 5    magnesium aspart,citrate,oxide 400 mg magnesium capsule Take 1 capsule by mouth once daily.      pantoprazole (ProtoNix) 40 mg EC tablet take 1 tablet by mouth once daily 90 tablet 3    tamsulosin (Flomax) 0.4 mg 24 hr capsule take 1 capsule by mouth once daily 90 capsule 3    umeclidinium (Incruse Ellipta) 62.5 mcg/actuation inhalation Inhale 1 puff (62.5 mcg) once daily. 30 each 11     No current  facility-administered medications on file prior to visit.               Medication compliance: Yes   Uses pill box/organizer: Yes    Carries medication list: Yes    Uses Inhalers appropriately: Yes     Blood Pressure Management  History of Hypertension: Yes   Medication Changes: No   Resting BP: 110/70    Heart Failure Management  Hx of Heart Failure: No    Smoking/Tobacco Assessment  Social History     Tobacco Use   Smoking Status Every Day    Current packs/day: 1.00    Types: Cigarettes   Smokeless Tobacco Never     Other Core Component Plan  Goal Status: Initial Assessment; goals not yet started  Other Core Component Goals: Learn to manage bronchial hygiene  Resting BP < 160/90.  Use all medications properly and as prescribed.  Complete smoking cessation.    Frequent Cough?: No  Educational Assessment:  Pulmonary Knowledge Test:   Intake: 9/15  Discharge:     Other Core Component Interventions/Education: Ensure patient is compliant with all medications throughout program.  Explain bronchial hygiene techniques.  Pre/post resting BPs at each session.    Individual Patient Goals:  Increase strength and endurance  Be able to do more activity with less SOB.    Goal Status: Initial Assessment; goals not yet started    Staff Comments:    Rehab Staff Signature: Emmanuel Carlos, RRT

## 2024-11-12 ENCOUNTER — PATIENT OUTREACH (OUTPATIENT)
Dept: PRIMARY CARE | Facility: CLINIC | Age: 74
End: 2024-11-12
Payer: MEDICARE

## 2024-11-12 NOTE — PROGRESS NOTES
Follow up call after hospitalization. Patient did not see PCP within 14 days of discharge.  At time of outreach call the patient feels as if their condition has improved . He will be scheduling future appt He stated that he started pulmonary rehab and feels good about it.  Addressed any questions or concerns.

## 2024-11-14 ENCOUNTER — PHARMACY VISIT (OUTPATIENT)
Dept: PHARMACY | Facility: CLINIC | Age: 74
End: 2024-11-14
Payer: MEDICARE

## 2024-11-19 ENCOUNTER — CLINICAL SUPPORT (OUTPATIENT)
Dept: CARDIAC REHAB | Facility: HOSPITAL | Age: 74
End: 2024-11-19
Payer: MEDICARE

## 2024-11-19 DIAGNOSIS — J44.9 STAGE 4 VERY SEVERE CHRONIC OBSTRUCTIVE PULMONARY DISEASE BY GLOBAL INITIATIVE FOR CHRONIC OBSTRUCTIVE LUNG DISEASE CLASSIFICATION (MULTI): ICD-10-CM

## 2024-11-19 PROCEDURE — 94625 PHY/QHP OP PULM RHB W/O MNTR: CPT | Performed by: INTERNAL MEDICINE

## 2024-11-21 ENCOUNTER — CLINICAL SUPPORT (OUTPATIENT)
Dept: CARDIAC REHAB | Facility: HOSPITAL | Age: 74
End: 2024-11-21
Payer: MEDICARE

## 2024-11-21 DIAGNOSIS — J44.9 STAGE 4 VERY SEVERE CHRONIC OBSTRUCTIVE PULMONARY DISEASE BY GLOBAL INITIATIVE FOR CHRONIC OBSTRUCTIVE LUNG DISEASE CLASSIFICATION (MULTI): ICD-10-CM

## 2024-11-21 PROCEDURE — 94625 PHY/QHP OP PULM RHB W/O MNTR: CPT | Performed by: INTERNAL MEDICINE

## 2024-11-22 ENCOUNTER — HOSPITAL ENCOUNTER (OUTPATIENT)
Dept: RADIOLOGY | Facility: CLINIC | Age: 74
Discharge: HOME | End: 2024-11-22
Payer: MEDICARE

## 2024-11-22 DIAGNOSIS — R91.8 LUNG NODULES: ICD-10-CM

## 2024-11-22 PROCEDURE — 71250 CT THORAX DX C-: CPT

## 2024-11-25 ENCOUNTER — TELEPHONE (OUTPATIENT)
Dept: PULMONOLOGY | Facility: HOSPITAL | Age: 74
End: 2024-11-25
Payer: MEDICARE

## 2024-11-25 NOTE — TELEPHONE ENCOUNTER
Patient acknowledged understanding. All questions answered at this time.    ----- Message from Fidelia Wilks sent at 11/25/2024  9:09 AM EST -----  Please call the patient and let him know that his CT chest shows that the prior area has decreased in size but he has a new opacity that appears to be inflammatory but we will have to follow up on this in 3 months, I will order this at his follow up.

## 2024-11-26 ENCOUNTER — PATIENT OUTREACH (OUTPATIENT)
Dept: PRIMARY CARE | Facility: CLINIC | Age: 74
End: 2024-11-26
Payer: MEDICARE

## 2024-11-26 ENCOUNTER — CLINICAL SUPPORT (OUTPATIENT)
Dept: CARDIAC REHAB | Facility: HOSPITAL | Age: 74
End: 2024-11-26
Payer: MEDICARE

## 2024-11-26 DIAGNOSIS — J44.9 STAGE 4 VERY SEVERE CHRONIC OBSTRUCTIVE PULMONARY DISEASE BY GLOBAL INITIATIVE FOR CHRONIC OBSTRUCTIVE LUNG DISEASE CLASSIFICATION (MULTI): ICD-10-CM

## 2024-11-26 PROCEDURE — 94625 PHY/QHP OP PULM RHB W/O MNTR: CPT | Performed by: INTERNAL MEDICINE

## 2024-12-03 ENCOUNTER — CLINICAL SUPPORT (OUTPATIENT)
Dept: CARDIAC REHAB | Facility: HOSPITAL | Age: 74
End: 2024-12-03
Payer: MEDICARE

## 2024-12-03 DIAGNOSIS — J44.9 STAGE 4 VERY SEVERE CHRONIC OBSTRUCTIVE PULMONARY DISEASE BY GLOBAL INITIATIVE FOR CHRONIC OBSTRUCTIVE LUNG DISEASE CLASSIFICATION (MULTI): ICD-10-CM

## 2024-12-03 PROCEDURE — 94625 PHY/QHP OP PULM RHB W/O MNTR: CPT | Performed by: INTERNAL MEDICINE

## 2024-12-05 ENCOUNTER — APPOINTMENT (OUTPATIENT)
Dept: CARDIAC REHAB | Facility: HOSPITAL | Age: 74
End: 2024-12-05
Payer: MEDICARE

## 2024-12-10 ENCOUNTER — OFFICE VISIT (OUTPATIENT)
Dept: PULMONOLOGY | Facility: HOSPITAL | Age: 74
End: 2024-12-10
Payer: MEDICARE

## 2024-12-10 ENCOUNTER — CLINICAL SUPPORT (OUTPATIENT)
Dept: CARDIAC REHAB | Facility: HOSPITAL | Age: 74
End: 2024-12-10
Payer: MEDICARE

## 2024-12-10 VITALS
TEMPERATURE: 99.3 F | WEIGHT: 118 LBS | HEIGHT: 70 IN | BODY MASS INDEX: 16.89 KG/M2 | OXYGEN SATURATION: 92 % | RESPIRATION RATE: 16 BRPM | SYSTOLIC BLOOD PRESSURE: 114 MMHG | HEART RATE: 104 BPM | DIASTOLIC BLOOD PRESSURE: 75 MMHG

## 2024-12-10 DIAGNOSIS — J44.9 STAGE 4 VERY SEVERE CHRONIC OBSTRUCTIVE PULMONARY DISEASE BY GLOBAL INITIATIVE FOR CHRONIC OBSTRUCTIVE LUNG DISEASE CLASSIFICATION (MULTI): ICD-10-CM

## 2024-12-10 DIAGNOSIS — F17.210 CIGARETTE NICOTINE DEPENDENCE WITHOUT COMPLICATION: Primary | ICD-10-CM

## 2024-12-10 PROCEDURE — RXMED WILLOW AMBULATORY MEDICATION CHARGE

## 2024-12-10 PROCEDURE — 1123F ACP DISCUSS/DSCN MKR DOCD: CPT | Performed by: NURSE PRACTITIONER

## 2024-12-10 PROCEDURE — 3078F DIAST BP <80 MM HG: CPT | Performed by: NURSE PRACTITIONER

## 2024-12-10 PROCEDURE — 3008F BODY MASS INDEX DOCD: CPT | Performed by: NURSE PRACTITIONER

## 2024-12-10 PROCEDURE — 1159F MED LIST DOCD IN RCRD: CPT | Performed by: NURSE PRACTITIONER

## 2024-12-10 PROCEDURE — 3074F SYST BP LT 130 MM HG: CPT | Performed by: NURSE PRACTITIONER

## 2024-12-10 PROCEDURE — 94625 PHY/QHP OP PULM RHB W/O MNTR: CPT | Performed by: INTERNAL MEDICINE

## 2024-12-10 PROCEDURE — 99213 OFFICE O/P EST LOW 20 MIN: CPT | Performed by: NURSE PRACTITIONER

## 2024-12-10 PROCEDURE — 1160F RVW MEDS BY RX/DR IN RCRD: CPT | Performed by: NURSE PRACTITIONER

## 2024-12-10 PROCEDURE — 4004F PT TOBACCO SCREEN RCVD TLK: CPT | Performed by: NURSE PRACTITIONER

## 2024-12-10 RX ORDER — FLUTICASONE PROPIONATE AND SALMETEROL 250; 50 UG/1; UG/1
1 POWDER RESPIRATORY (INHALATION)
Qty: 1 EACH | Refills: 11 | Status: SHIPPED | OUTPATIENT
Start: 2024-12-10 | End: 2024-12-10 | Stop reason: SDUPTHER

## 2024-12-10 RX ORDER — FLUTICASONE PROPIONATE AND SALMETEROL 250; 50 UG/1; UG/1
1 POWDER RESPIRATORY (INHALATION)
Qty: 60 EACH | Refills: 11 | Status: SHIPPED | OUTPATIENT
Start: 2024-12-10

## 2024-12-10 ASSESSMENT — ENCOUNTER SYMPTOMS
CHILLS: 0
UNEXPECTED WEIGHT CHANGE: 0
RHINORRHEA: 0
FEVER: 0
WHEEZING: 0
COUGH: 1
FATIGUE: 0
SHORTNESS OF BREATH: 1

## 2024-12-10 NOTE — PATIENT INSTRUCTIONS
Continue on generic Advair 1 puff twice a day, everyday. Rinse mouth after use.   Continue on Incruse one puff once a day.  Continue albuterol and nebulizer as needed.  Please get CT scan of your chest for lung cancer screening in the end of February for follow up.   Call with any questions or concerns.  Follow up with me in March.

## 2024-12-10 NOTE — PROGRESS NOTES
"Subjective   Patient ID: Verner L Blankenship \"Navjot" is a 74 y.o. male who presents for follow up COPD.     HPI: Patient has PMH of COPD, HTN, GERD and BPH. Here for pulmonary follow-up for COPD. He was diagnosed with COPD many years ago. He has BAUTISTA and cough, denies sputum production. Has wheezing, worse with activity such as mowing his lawn. He denies SOB at rest, chest pain/tightness, nasal congestion/post-nasal drip. He has GERD, is controlled on protonix.     Today he is here for follow up. He states that he is taking generic Advair and Incruse but the Advair is not on his med list. He state that breathing is the same since follow up. He uses nebulizer about three times per day. He is smoking at 1/2 ppd. He has no other concerns.     Review of Systems   Constitutional:  Negative for chills, fatigue, fever and unexpected weight change.   HENT:  Negative for congestion, postnasal drip and rhinorrhea.    Respiratory:  Positive for cough (denies hemoptysis.) and shortness of breath. Negative for wheezing.    Cardiovascular:  Negative for chest pain and leg swelling.   All other systems reviewed and are negative.      Objective   Physical Exam  Vitals reviewed.   Constitutional:       Appearance: Normal appearance.   HENT:      Head: Normocephalic.   Cardiovascular:      Rate and Rhythm: Normal rate and regular rhythm.   Pulmonary:      Effort: Pulmonary effort is normal.      Breath sounds: Decreased breath sounds present.   Skin:     General: Skin is warm and dry.   Neurological:      Mental Status: He is alert.         Assessment/Plan   1) COPD (GOLD 4) - PFT 9/3/24 with severe obstruction, FEV1 30%, no BD response; was started on Wixela at last visit and had pharmacy referral for financial assistance for inhalers, he was started on Incruse and didn't realize he needed to continue Wixela so only using Incruse and having SOB  - educated to use Wixela 1 puff twice daily (rinse/spit) and Incruse 1 puff once daily. " He states he is taking Wixela but it is not on his list I will verify with pharmacy.  - ordered duonebs in place of albuterol nebs, advised duoneb or albuterol MDI q4-6hr PRN SOB or wheezing  - ordered pulmonary rehab     2) Cigarette nicotine dependence - current smoker, about 1 PPD since age 6, ~68 pack year history   - >5 minutes smoking cessation counseling   - offered pharmacologic options to assist with quitting: ordered nicotine patches and nicotine lozenges     3) Lung nodules - LDCT 8/8/24 with 10 mm irregular nodular density at R apex, new 9 x 5 mm subpleural RUL nodule, 8 mm RLL nodule  - had already reviewed results with patient via telephone and ordered repeat LDCT to be done in 3 months from previous, he has it scheduled for 11/22/24. This showed decrease in size of a RUL nodule but a new ground glass nodule in the LLL likely inflammatory and 3 month follow up recommended, I ordered this for end of February.     Overall I will continue current regimen and will get  in February. I will see him back in March. I instructed patient to call sooner if needed.      Total time:  25 min.

## 2024-12-11 ENCOUNTER — DOCUMENTATION (OUTPATIENT)
Dept: CARDIAC REHAB | Facility: HOSPITAL | Age: 74
End: 2024-12-11
Payer: MEDICARE

## 2024-12-11 NOTE — PROGRESS NOTES
Pulmonary Rehabilitation  30 Day Reassessment    Name: Verner L Blankenship  Medical Record Number: [id number]  YOB: 1950  Age: 74 y.o.    Today’s Date: 12/11/2024  Primary Care Physician: Kirsten Jay DO  Referring Physician: No ref. provider found  Program Location: [facility]  Primary Diagnosis:   1. Stage 4 very severe chronic obstructive pulmonary disease by Global Initiative for Chronic Obstructive Lung Disease classification (Multi)  Referral to Pulmonary Rehabilitation      Onset/Date of Diagnosis: 2010    Session #: 5    Falls Risk: Low  Psychosocial Assessment  Pre PHQ-9: 0  Post PHQ-9:   Sent PH-Q 9 to MD if score > 20: No; score < 20    Stress Management  Pt reported/currently experiencing stress: No  Patient uses stress management skills: Yes   History of: no history of anxiety or depression  Currently seeing a mental health provider: No  Social Support: Yes  Pre CAT score: 14  Post CAT score:   Learning Assessment:  Learning assessment/barriers: None  Preferred learning method: Auditory, Visual, and Reading handout  Barriers: None  Stages of Change:Action    Psychosocial Plan  Goal Status: In Progress   Psychosocial Goals: Decrease CAT score  Maintain PHQ-9 score.  Learn about living with chronic lung disease.  Learn to use stress management techniques.    Psychosocial Interventions/Education: Educate patient on how to live with chronic lung disease.  Question patient on new or existing stress/anxiety issues at least once every 30 days.  30 Day Reassessment: Patient reports no new stress, anxiety or depression at this time.    Oxygen Assessment  SpO2 at rest: 96 % on RA  SpO2 with exertion: 87 % on RA    Oxygen Use  Supplemental O2 prescribed: No,   Hypoxia managed: Yes   Home Pulse Oximeter: Yes     Pre MMRC: 3  Post MMRC:     Oxygen Plan  Goal Status: In Progress  Oxygen Goals: Decrease MMRC score  Learn and use diaphragmatic breathing as needed.  Learn and use pursed  lip breathing as needed.  Titrate supplemental O2 as needed to keep SpO2 at 88% or greater.    History of WANG?: No    Oxygen Education/Interventions: Learning to use pursed lip breathing during exercise to help lessen dyspnea.  SpO2 checks with pulse oximeter at rest and with each modality.  Supplemental O2 as needed.  Titrate supplemental O2 as needed to keep SpO2 at 88% or greater.  30 Day Reassessment: Pt educated on diaphragmatic and pursed lip breathing techniques: Yes  Continuing to reinforce proper breathing techniques.  Spo2 of 87 or > maintained on RA with exercise.    Nutrition Assessment:  Hyperlipidemia: No   Lipids:   Lab Results   Component Value Date    CHOL 119 03/16/2020    HDL 43.3 03/16/2020    LDLF 68 03/16/2020    TRIG 41 03/16/2020   Current Dietary Guidelines:  None  Barriers to dietary change: no    Diabetes Assessment  Lab Results   Component Value Date    HGBA1C 5.8 (H) 06/06/2024   History of Diabetes: No    Weight Management  Start of rehab weight: 118 lbs  Height: 182.9 cm (6')  Weight: 117.4  BMI (Calculated): 15.9    Nutrition Plan  Goal Status: In Progress  Nutrition Goals: Learn about healthy eating habits.  Maintain current weight.    Nutrition Interventions/Education: Body weight checked weekly.  Discuss fat screener results and recommendations.  30 Day Reassessment: Continuing to re-inforce healthy eating habits for pt.  Fatscreener & AHA H/O on fats reviewed and given to patient: Yes  Weight maintained since start of rehab.    Exercise Assessment  Home Exercise: No  Mode: NA  Frequency: NA  Duration: NA    6 Minute Walk Assessment   6 MWT distance: 860 ft  FiO2 used during test: RA    Exercise Prescription  Exercise Prescription based on: 6 Minute Walk Test   Frequency:  2 days/week   Mode: Treadmill, Recumbent Cycle, Arm Ergometer, and SciFit   Duration: 20-40 total aerobic minutes   Intensity: RPE 11-13  Target HR:  Rest +30  MET Level: 2+  Patient wears supplemental O2: No    Modality Workload METs Duration (minutes)   1 Pre-Exercise      2 Scifit Stepper 1.0  2.5 8 :00   3 Recumbent Bike #1  3.0 8 :00   4 Arm Ergometer UEE 5W   8 :00   5 Treadmill 1.5/0  2.2 8 :00   6 Post-Exercise      Resistance Training: Yes   Home Exercise Prescription given: To be given prior to discharge from program.    Exercise Plan  Goal Status: In Progress  Exercise Goals: By discharge:  Increase exercise MET level by 5-10% each week  Increase total exercise duration to 30-45 minutes  Obtain 150 minutes/week of moderate intensity aerobic exercise  Initiate strength training 2-3 days a week  Initiate flexibility training 2-3 days a week  Establish a home exercise program before discharge    Exercise Interventions/Education: SPO2 goal is to add or titrate supplemnetal O2 as necessary at rest/exercise via nasal cannula or mask to prevent <88% SPO2.  Target goal for duration: 30-45 minutes; Increase MET level 5-10% each week or as tolerated.    Physical exercise restrictions: None  30 Day Reassessment: Changes made based on HR and RPE/D responses to exercise.  Received education each week present.  Endurance increased to 32 minutes per session.  Education Classes Completed: Preventing Infection, Diabetes, Risk Factors, and Using an Inhaler    Other Core Components/Risk Factor Assessment:  Medication adherence  Current Medications:   Current Outpatient Medications on File Prior to Visit   Medication Sig Dispense Refill    albuterol (Ventolin HFA) 90 mcg/actuation inhaler inhale 2 puffs by mouth every 4 hours as needed for shortness of breath 18 g 11    amLODIPine (Norvasc) 5 mg tablet Take 1 tablet (5 mg) by mouth once daily. 90 tablet 3    ferrous gluconate 236 mg (27 mg iron) tablet Take 1 tablet (27 mg) by mouth every other day.      fluticasone propion-salmeteroL (Wixela Inhub) 250-50 mcg/dose diskus inhaler Inhale 1 puff 2 times a day. Rinse mouth with water after use to reduce aftertaste and incidence of  candidiasis. Do not swallow. 60 each 11    ipratropium-albuteroL (Duo-Neb) 0.5-2.5 mg/3 mL nebulizer solution Take 3 mL by nebulization every 4 hours if needed for shortness of breath or wheezing. 360 mL 5    magnesium aspart,citrate,oxide 400 mg magnesium capsule Take 1 capsule by mouth once daily.      pantoprazole (ProtoNix) 40 mg EC tablet take 1 tablet by mouth once daily 90 tablet 3    tamsulosin (Flomax) 0.4 mg 24 hr capsule take 1 capsule by mouth once daily 90 capsule 3    umeclidinium (Incruse Ellipta) 62.5 mcg/actuation inhalation Inhale 1 puff (62.5 mcg) once daily. 30 each 11    [DISCONTINUED] fluticasone propion-salmeteroL (Wixela Inhub) 250-50 mcg/dose diskus inhaler Inhale 1 puff 2 times a day. Rinse mouth with water after use to reduce aftertaste and incidence of candidiasis. Do not swallow. 1 each 11     No current facility-administered medications on file prior to visit.               Medication compliance: Yes   Uses pill box/organizer: Yes    Carries medication list: Yes    Uses Inhalers appropriately: Yes     Blood Pressure Management  History of Hypertension: Yes   Medication Changes: No   Resting BP: 109/75    Heart Failure Management  Hx of Heart Failure: No    Smoking/Tobacco Assessment  Social History     Tobacco Use   Smoking Status Every Day    Current packs/day: 1.00    Average packs/day: 1 pack/day for 59.9 years (59.9 ttl pk-yrs)    Types: Cigarettes    Start date: 1965   Smokeless Tobacco Never     Other Core Component Plan  Goal Status: In Progress  Other Core Component Goals: Learn to manage bronchial hygiene  Resting BP < 160/90.  Use all medications properly and as prescribed.  Complete smoking cessation.    Frequent Cough?: No  Educational Assessment:  Pulmonary Knowledge Test:   Intake: 9/15  Discharge:     Other Core Component Interventions/Education: Ensure patient is compliant with all medications throughout program.  Explain bronchial hygiene techniques.  Pre/post resting  BPs at each session.  30 Day Reassessment: Reviewed effects of exercise on BP: Yes  Reviewed knowledge test: Yes  Smoking cessation provided: Yes  BP remains within target goal, compliant with meds. Pt reports no issues at this time.  Carlito states that he has decreased his smoking to 10-15 cigarettes per day and will continue to decrease.     Individual Patient Goals:  Increase strength and endurance  Be able to do more activity with less SOB.    Goal Status: In Progress    Staff Comments:  30 Day Reassessment: Carlito has done well in [facility] rehab. He has attended 5 sessions and seems to be motivated to make progress. Currently exercising for 32 minutes and METs are in between 2.2 and 3.0. We will continue to increase duration and resistance as able. Provided encouragement for smoking cessation.    Rehab Staff Signature: Emmanuel Carlos, RRT

## 2024-12-12 ENCOUNTER — CLINICAL SUPPORT (OUTPATIENT)
Dept: CARDIAC REHAB | Facility: HOSPITAL | Age: 74
End: 2024-12-12
Payer: MEDICARE

## 2024-12-12 ENCOUNTER — PHARMACY VISIT (OUTPATIENT)
Dept: PHARMACY | Facility: CLINIC | Age: 74
End: 2024-12-12
Payer: MEDICARE

## 2024-12-12 DIAGNOSIS — J44.9 STAGE 4 VERY SEVERE CHRONIC OBSTRUCTIVE PULMONARY DISEASE BY GLOBAL INITIATIVE FOR CHRONIC OBSTRUCTIVE LUNG DISEASE CLASSIFICATION (MULTI): ICD-10-CM

## 2024-12-12 PROCEDURE — 94625 PHY/QHP OP PULM RHB W/O MNTR: CPT | Performed by: INTERNAL MEDICINE

## 2024-12-17 ENCOUNTER — CLINICAL SUPPORT (OUTPATIENT)
Dept: CARDIAC REHAB | Facility: HOSPITAL | Age: 74
End: 2024-12-17
Payer: MEDICARE

## 2024-12-17 ENCOUNTER — APPOINTMENT (OUTPATIENT)
Dept: PRIMARY CARE | Facility: CLINIC | Age: 74
End: 2024-12-17
Payer: MEDICARE

## 2024-12-17 VITALS
WEIGHT: 117 LBS | TEMPERATURE: 96.8 F | HEART RATE: 100 BPM | BODY MASS INDEX: 16.75 KG/M2 | DIASTOLIC BLOOD PRESSURE: 70 MMHG | OXYGEN SATURATION: 97 % | SYSTOLIC BLOOD PRESSURE: 127 MMHG | RESPIRATION RATE: 20 BRPM | HEIGHT: 70 IN

## 2024-12-17 DIAGNOSIS — D53.9 ANEMIA, DEFICIENCY: ICD-10-CM

## 2024-12-17 DIAGNOSIS — Z11.59 NEED FOR HEPATITIS C SCREENING TEST: ICD-10-CM

## 2024-12-17 DIAGNOSIS — J44.9 STAGE 4 VERY SEVERE CHRONIC OBSTRUCTIVE PULMONARY DISEASE BY GLOBAL INITIATIVE FOR CHRONIC OBSTRUCTIVE LUNG DISEASE CLASSIFICATION (MULTI): ICD-10-CM

## 2024-12-17 DIAGNOSIS — K21.9 GASTROESOPHAGEAL REFLUX DISEASE WITHOUT ESOPHAGITIS: ICD-10-CM

## 2024-12-17 DIAGNOSIS — R64 CACHEXIA (MULTI): ICD-10-CM

## 2024-12-17 DIAGNOSIS — R73.9 HYPERGLYCEMIA: ICD-10-CM

## 2024-12-17 DIAGNOSIS — I10 ESSENTIAL HYPERTENSION: Primary | ICD-10-CM

## 2024-12-17 PROCEDURE — 4004F PT TOBACCO SCREEN RCVD TLK: CPT | Performed by: FAMILY MEDICINE

## 2024-12-17 PROCEDURE — 1126F AMNT PAIN NOTED NONE PRSNT: CPT | Performed by: FAMILY MEDICINE

## 2024-12-17 PROCEDURE — 3078F DIAST BP <80 MM HG: CPT | Performed by: FAMILY MEDICINE

## 2024-12-17 PROCEDURE — 99213 OFFICE O/P EST LOW 20 MIN: CPT | Performed by: FAMILY MEDICINE

## 2024-12-17 PROCEDURE — 1159F MED LIST DOCD IN RCRD: CPT | Performed by: FAMILY MEDICINE

## 2024-12-17 PROCEDURE — 1123F ACP DISCUSS/DSCN MKR DOCD: CPT | Performed by: FAMILY MEDICINE

## 2024-12-17 PROCEDURE — G2211 COMPLEX E/M VISIT ADD ON: HCPCS | Performed by: FAMILY MEDICINE

## 2024-12-17 PROCEDURE — 1160F RVW MEDS BY RX/DR IN RCRD: CPT | Performed by: FAMILY MEDICINE

## 2024-12-17 PROCEDURE — 3074F SYST BP LT 130 MM HG: CPT | Performed by: FAMILY MEDICINE

## 2024-12-17 PROCEDURE — 94625 PHY/QHP OP PULM RHB W/O MNTR: CPT | Performed by: INTERNAL MEDICINE

## 2024-12-17 PROCEDURE — 3008F BODY MASS INDEX DOCD: CPT | Performed by: FAMILY MEDICINE

## 2024-12-17 PROCEDURE — RXMED WILLOW AMBULATORY MEDICATION CHARGE

## 2024-12-17 RX ORDER — PANTOPRAZOLE SODIUM 40 MG/1
40 TABLET, DELAYED RELEASE ORAL DAILY
Qty: 90 TABLET | Refills: 3 | Status: SHIPPED | OUTPATIENT
Start: 2024-12-17

## 2024-12-17 RX ORDER — FLUTICASONE PROPIONATE AND SALMETEROL 250; 50 UG/1; UG/1
1 POWDER RESPIRATORY (INHALATION)
Qty: 60 EACH | Refills: 11 | Status: CANCELLED | OUTPATIENT
Start: 2024-12-17

## 2024-12-17 RX ORDER — GLUC/MSM/COLGN2/HYAL/ANTIARTH3 375-375-20
1 TABLET ORAL EVERY OTHER DAY
Qty: 90 TABLET | Refills: 1 | Status: CANCELLED | OUTPATIENT
Start: 2024-12-17

## 2024-12-17 RX ORDER — AMLODIPINE BESYLATE 5 MG/1
5 TABLET ORAL DAILY
Qty: 90 TABLET | Refills: 3 | Status: SHIPPED | OUTPATIENT
Start: 2024-12-17 | End: 2025-12-17

## 2024-12-17 RX ORDER — ALBUTEROL SULFATE 90 UG/1
2 INHALANT RESPIRATORY (INHALATION) EVERY 4 HOURS PRN
Qty: 18 G | Refills: 11 | Status: CANCELLED | OUTPATIENT
Start: 2024-12-17

## 2024-12-17 RX ORDER — IPRATROPIUM BROMIDE AND ALBUTEROL SULFATE 2.5; .5 MG/3ML; MG/3ML
3 SOLUTION RESPIRATORY (INHALATION) EVERY 4 HOURS PRN
Qty: 360 ML | Refills: 5 | Status: CANCELLED | OUTPATIENT
Start: 2024-12-17

## 2024-12-17 RX ORDER — UMECLIDINIUM 62.5 UG/1
1 AEROSOL, POWDER ORAL DAILY
Qty: 30 EACH | Refills: 11 | Status: CANCELLED | OUTPATIENT
Start: 2024-12-17

## 2024-12-17 RX ORDER — TAMSULOSIN HYDROCHLORIDE 0.4 MG/1
0.4 CAPSULE ORAL DAILY
Qty: 90 CAPSULE | Refills: 3 | Status: CANCELLED | OUTPATIENT
Start: 2024-12-17

## 2024-12-17 SDOH — ECONOMIC STABILITY: FOOD INSECURITY: WITHIN THE PAST 12 MONTHS, THE FOOD YOU BOUGHT JUST DIDN'T LAST AND YOU DIDN'T HAVE MONEY TO GET MORE.: NEVER TRUE

## 2024-12-17 SDOH — ECONOMIC STABILITY: FOOD INSECURITY: WITHIN THE PAST 12 MONTHS, YOU WORRIED THAT YOUR FOOD WOULD RUN OUT BEFORE YOU GOT MONEY TO BUY MORE.: NEVER TRUE

## 2024-12-17 ASSESSMENT — PAIN SCALES - GENERAL: PAINLEVEL_OUTOF10: 0-NO PAIN

## 2024-12-17 NOTE — ASSESSMENT & PLAN NOTE
Controlled, continue amlodipine  Orders:    Comprehensive metabolic panel; Future    amLODIPine (Norvasc) 5 mg tablet; Take 1 tablet (5 mg) by mouth once daily.

## 2024-12-17 NOTE — ASSESSMENT & PLAN NOTE
Controlled on pantoprazole, continue  Orders:    pantoprazole (ProtoNix) 40 mg EC tablet; Take 1 tablet (40 mg) by mouth once daily.

## 2024-12-17 NOTE — PROGRESS NOTES
"Subjective   Patient ID: Verner L Blankenship \"Navjto" is a 74 y.o. male who presents for Anemia and COPD.  No new problems or concerns.  He is currently doing pulmonary rehab and feels that it has helped with his energy level and function.        Patient Care Team:  Kirsten Jay DO as PCP - General  Kirsten Jay DO as PCP - [employer] PCP  Avril Otoole LPN as Care Manager (Case Management)  SHAUNA Barfield-CNP as Nurse Practitioner (Pulmonary Disease)    Current Outpatient Medications   Medication Sig Dispense Refill    albuterol (Ventolin HFA) 90 mcg/actuation inhaler inhale 2 puffs by mouth every 4 hours as needed for shortness of breath 18 g 11    ferrous gluconate 236 mg (27 mg iron) tablet Take 1 tablet (27 mg) by mouth every other day.      fluticasone propion-salmeteroL (Wixela Inhub) 250-50 mcg/dose diskus inhaler Inhale 1 puff 2 times a day. Rinse mouth with water after use to reduce aftertaste and incidence of candidiasis. Do not swallow. 60 each 11    ipratropium-albuteroL (Duo-Neb) 0.5-2.5 mg/3 mL nebulizer solution Take 3 mL by nebulization every 4 hours if needed for shortness of breath or wheezing. 360 mL 5    magnesium aspart,citrate,oxide 400 mg magnesium capsule Take 1 capsule by mouth once daily.      tamsulosin (Flomax) 0.4 mg 24 hr capsule take 1 capsule by mouth once daily 90 capsule 3    umeclidinium (Incruse Ellipta) 62.5 mcg/actuation inhalation Inhale 1 puff (62.5 mcg) once daily. 30 each 11    amLODIPine (Norvasc) 5 mg tablet Take 1 tablet (5 mg) by mouth once daily. 90 tablet 3    pantoprazole (ProtoNix) 40 mg EC tablet Take 1 tablet (40 mg) by mouth once daily. 90 tablet 3     No current facility-administered medications for this visit.       Objective     Visit Vitals  /70 (BP Location: Left arm, Patient Position: Sitting, BP Cuff Size: Adult)   Pulse 100   Temp 36 °C (96.8 °F) (Temporal)   Resp 20   Ht 1.778 m (5' 10\")   Wt 53.1 kg (117 lb)   SpO2 " 97%   BMI 16.79 kg/m²   Smoking Status Every Day   BSA 1.62 m²        Constitutional: cachexia, well developed, appears stated age  Eyes: no scleral icterus, no conjunctival injection  Ears: normal external auditory canal, no retraction or bulging of tympanic membranes  Neck: no thyromegaly  Cardiovascular: regular rate and rhythm, no leg edema  Respiratory: normal respiratory effort, clear to auscultation bilaterally  Musculoskeletal: increased thoracic kyphosis   Skin: Warm, dry. No rashes  Neurologic: Alert, CNs II-XII grossly intact..  Psych: Appropriate mood and affect.        Assessment & Plan  Essential hypertension  Controlled, continue amlodipine  Orders:    Comprehensive metabolic panel; Future    amLODIPine (Norvasc) 5 mg tablet; Take 1 tablet (5 mg) by mouth once daily.    Hyperglycemia    Orders:    Hemoglobin A1C; Future    Anemia, deficiency    Orders:    Iron level; Future    CBC; Future    Folate; Future    Gastroesophageal reflux disease without esophagitis  Controlled on pantoprazole, continue  Orders:    pantoprazole (ProtoNix) 40 mg EC tablet; Take 1 tablet (40 mg) by mouth once daily.    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     Kirsten Jay DO

## 2024-12-17 NOTE — PATIENT INSTRUCTIONS
Thank you for choosing [facility] for your healthcare.   As always if you have any questions or concerns please do not hesitate to call our office at [phone] or through [employer].    Have a great day!  Kirsten Jay, DO

## 2024-12-19 ENCOUNTER — PHARMACY VISIT (OUTPATIENT)
Dept: PHARMACY | Facility: CLINIC | Age: 74
End: 2024-12-19
Payer: MEDICARE

## 2024-12-19 ENCOUNTER — CLINICAL SUPPORT (OUTPATIENT)
Dept: CARDIAC REHAB | Facility: HOSPITAL | Age: 74
End: 2024-12-19
Payer: MEDICARE

## 2024-12-19 DIAGNOSIS — J44.9 STAGE 4 VERY SEVERE CHRONIC OBSTRUCTIVE PULMONARY DISEASE BY GLOBAL INITIATIVE FOR CHRONIC OBSTRUCTIVE LUNG DISEASE CLASSIFICATION (MULTI): ICD-10-CM

## 2024-12-19 PROCEDURE — 94625 PHY/QHP OP PULM RHB W/O MNTR: CPT | Performed by: INTERNAL MEDICINE

## 2024-12-26 ENCOUNTER — CLINICAL SUPPORT (OUTPATIENT)
Dept: CARDIAC REHAB | Facility: HOSPITAL | Age: 74
End: 2024-12-26
Payer: MEDICARE

## 2024-12-26 DIAGNOSIS — J44.9 STAGE 4 VERY SEVERE CHRONIC OBSTRUCTIVE PULMONARY DISEASE BY GLOBAL INITIATIVE FOR CHRONIC OBSTRUCTIVE LUNG DISEASE CLASSIFICATION (MULTI): ICD-10-CM

## 2024-12-26 PROCEDURE — 94625 PHY/QHP OP PULM RHB W/O MNTR: CPT | Performed by: INTERNAL MEDICINE

## 2025-01-02 ENCOUNTER — CLINICAL SUPPORT (OUTPATIENT)
Dept: CARDIAC REHAB | Facility: HOSPITAL | Age: 75
End: 2025-01-02
Payer: MEDICARE

## 2025-01-02 DIAGNOSIS — J44.9 STAGE 4 VERY SEVERE CHRONIC OBSTRUCTIVE PULMONARY DISEASE BY GLOBAL INITIATIVE FOR CHRONIC OBSTRUCTIVE LUNG DISEASE CLASSIFICATION (MULTI): ICD-10-CM

## 2025-01-02 PROCEDURE — 94625 PHY/QHP OP PULM RHB W/O MNTR: CPT | Performed by: INTERNAL MEDICINE

## 2025-01-04 PROCEDURE — RXMED WILLOW AMBULATORY MEDICATION CHARGE

## 2025-01-07 ENCOUNTER — CLINICAL SUPPORT (OUTPATIENT)
Dept: CARDIAC REHAB | Facility: HOSPITAL | Age: 75
End: 2025-01-07
Payer: MEDICARE

## 2025-01-07 DIAGNOSIS — J44.9 STAGE 4 VERY SEVERE CHRONIC OBSTRUCTIVE PULMONARY DISEASE BY GLOBAL INITIATIVE FOR CHRONIC OBSTRUCTIVE LUNG DISEASE CLASSIFICATION (MULTI): ICD-10-CM

## 2025-01-07 PROCEDURE — 94625 PHY/QHP OP PULM RHB W/O MNTR: CPT | Performed by: INTERNAL MEDICINE

## 2025-01-08 ENCOUNTER — DOCUMENTATION (OUTPATIENT)
Dept: CARDIAC REHAB | Facility: HOSPITAL | Age: 75
End: 2025-01-08
Payer: MEDICARE

## 2025-01-08 ENCOUNTER — PHARMACY VISIT (OUTPATIENT)
Dept: PHARMACY | Facility: CLINIC | Age: 75
End: 2025-01-08
Payer: MEDICARE

## 2025-01-08 NOTE — PROGRESS NOTES
Pulmonary Rehabilitation  60 Day Reassessment    Name: Verner L Blankenship  Medical Record Number: [id number]  YOB: 1950  Age: 74 y.o.    Today’s Date: 1/8/2025  Primary Care Physician: Kirsten Jay DO  Referring Physician: Tim Gil MD  Program Location: [facility]  Primary Diagnosis:   1. Stage 4 very severe chronic obstructive pulmonary disease by Global Initiative for Chronic Obstructive Lung Disease classification (Multi)  Referral to Pulmonary Rehabilitation      Onset/Date of Diagnosis: 2010    Session #: 11    Falls Risk: Low  Psychosocial Assessment  Pre PHQ-9: 0  Post PHQ-9:   Sent PH-Q 9 to MD if score > 20: No; score < 20    Stress Management  Pt reported/currently experiencing stress: No  Patient uses stress management skills: Yes   History of: no history of anxiety or depression  Currently seeing a mental health provider: No  Social Support: Yes  Pre CAT score: 14  Post CAT score:   Learning Assessment:  Learning assessment/barriers: None  Preferred learning method: Auditory, Visual, and Reading handout  Barriers: None  Stages of Change:Action    Psychosocial Plan  Goal Status: In Progress   Psychosocial Goals: Decrease CAT score  Maintain PHQ-9 score.  Learn about living with chronic lung disease.  Learn to use stress management techniques.    Psychosocial Interventions/Education: Educate patient on how to live with chronic lung disease.  Question patient on new or existing stress/anxiety issues at least once every 30 days.  60 Day Reassessment: Patient reports no new stress, anxiety or depression at this time.    Oxygen Assessment  SpO2 at rest: 96 % on RA  SpO2 with exertion: 87 % on RA    Oxygen Use  Supplemental O2 prescribed: No,   Hypoxia managed: Yes   Home Pulse Oximeter: Yes     Pre MMRC: 3  Post MMRC:     Oxygen Plan  Goal Status: In Progress  Oxygen Goals: Decrease MMRC score  Learn and use diaphragmatic breathing as needed.  Learn and use pursed lip  breathing as needed.  Titrate supplemental O2 as needed to keep SpO2 at 88% or greater.    History of WANG?: No    Oxygen Education/Interventions: Learning to use pursed lip breathing during exercise to help lessen dyspnea.  SpO2 checks with pulse oximeter at rest and with each modality.  Supplemental O2 as needed.  Titrate supplemental O2 as needed to keep SpO2 at 88% or greater.  60 Day Reassessment: Pt educated on diaphragmatic and pursed lip breathing techniques: Yes  Continuing to reinforce proper breathing techniques.  Spo2 of 87 or > maintained on RA with exercise.    Nutrition Assessment:  Hyperlipidemia: No   Lipids:   Lab Results   Component Value Date    CHOL 119 03/16/2020    HDL 43.3 03/16/2020    LDLF 68 03/16/2020    TRIG 41 03/16/2020   Current Dietary Guidelines:  None  Barriers to dietary change: no    Diabetes Assessment  Lab Results   Component Value Date    HGBA1C 5.8 (H) 06/06/2024   History of Diabetes: No    Weight Management  Start of rehab weight: 118 lbs  Height: 182.9 cm (6')  Weight: 117.0  BMI (Calculated): 15.9    Nutrition Plan  Goal Status: In Progress  Nutrition Goals: Learn about healthy eating habits.  Maintain current weight.    Nutrition Interventions/Education: Body weight checked weekly.  Discuss fat screener results and recommendations.  30 Day Reassessment: Continuing to re-inforce healthy eating habits for pt.  Fatscreener & AHA H/O on fats reviewed and given to patient: Yes  Weight loss of 1 lb since start of rehab.    Exercise Assessment  Home Exercise: No  Mode: NA  Frequency: NA  Duration: NA    6 Minute Walk Assessment   6 MWT distance: 860 ft  FiO2 used during test: RA    Exercise Prescription  Exercise Prescription based on: 6 Minute Walk Test   Frequency:  2 days/week   Mode: Treadmill, Recumbent Cycle, Arm Ergometer, and SciFit   Duration: 20-40 total aerobic minutes   Intensity: RPE 11-13  Target HR:  Rest +30  MET Level: 2+  Patient wears supplemental O2: No    Modality Workload METs Duration (minutes)   1 Pre-Exercise      2 Scifit Stepper 1.0  2.5 10 :00   3 Recumbent Bike #1  3.0 10 :00   4 Arm Ergometer UEE 5W   10 :00   5 Treadmill 1.5/0  2.2 10 :00   6 Post-Exercise      Resistance Training: Yes   Home Exercise Prescription given: To be given prior to discharge from program.    Exercise Plan  Goal Status: In Progress  Exercise Goals: By discharge:  Increase exercise MET level by 5-10% each week  Increase total exercise duration to 30-45 minutes  Obtain 150 minutes/week of moderate intensity aerobic exercise  Initiate strength training 2-3 days a week  Initiate flexibility training 2-3 days a week  Establish a home exercise program before discharge    Exercise Interventions/Education: SPO2 goal is to add or titrate supplemnetal O2 as necessary at rest/exercise via nasal cannula or mask to prevent <88% SPO2.  Target goal for duration: 30-45 minutes; Increase MET level 5-10% each week or as tolerated.    Physical exercise restrictions: None  60 Day Reassessment: Changes made based on HR and RPE/D responses to exercise.  Received education each week present.  Endurance increased to 40 minutes per session.  Education Classes Completed: Preventing Infection, Diabetes, Risk Factors, Using an Inhaler, Breathing Techniques, and How to Live with COPD    Other Core Components/Risk Factor Assessment:  Medication adherence  Current Medications:   Current Outpatient Medications on File Prior to Visit   Medication Sig Dispense Refill    albuterol (Ventolin HFA) 90 mcg/actuation inhaler inhale 2 puffs by mouth every 4 hours as needed for shortness of breath 18 g 11    amLODIPine (Norvasc) 5 mg tablet Take 1 tablet (5 mg) by mouth once daily. 90 tablet 3    ferrous gluconate 236 mg (27 mg iron) tablet Take 1 tablet (27 mg) by mouth every other day.      fluticasone propion-salmeteroL (Wixela Inhub) 250-50 mcg/dose diskus inhaler Inhale 1 puff 2 times a day. Rinse mouth with water  after use to reduce aftertaste and incidence of candidiasis. Do not swallow. 60 each 11    ipratropium-albuteroL (Duo-Neb) 0.5-2.5 mg/3 mL nebulizer solution Take 3 mL by nebulization every 4 hours if needed for shortness of breath or wheezing. 360 mL 5    magnesium aspart,citrate,oxide 400 mg magnesium capsule Take 1 capsule by mouth once daily.      pantoprazole (ProtoNix) 40 mg EC tablet Take 1 tablet (40 mg) by mouth once daily. 90 tablet 3    tamsulosin (Flomax) 0.4 mg 24 hr capsule take 1 capsule by mouth once daily 90 capsule 3    umeclidinium (Incruse Ellipta) 62.5 mcg/actuation inhalation Inhale 1 puff (62.5 mcg) once daily. 30 each 11     No current facility-administered medications on file prior to visit.               Medication compliance: Yes   Uses pill box/organizer: Yes    Carries medication list: Yes    Uses Inhalers appropriately: Yes     Blood Pressure Management  History of Hypertension: Yes   Medication Changes: No   Resting BP: 108/74    Heart Failure Management  Hx of Heart Failure: No    Smoking/Tobacco Assessment  Social History     Tobacco Use   Smoking Status Every Day    Current packs/day: 1.00    Average packs/day: 1 pack/day for 60.0 years (60.0 ttl pk-yrs)    Types: Cigarettes    Start date: 1965   Smokeless Tobacco Never     Other Core Component Plan  Goal Status: In Progress  Other Core Component Goals: Learn to manage bronchial hygiene  Resting BP < 160/90.  Use all medications properly and as prescribed.  Complete smoking cessation.    Frequent Cough?: No  Educational Assessment:  Pulmonary Knowledge Test:   Intake: 9/15  Discharge:     Other Core Component Interventions/Education: Ensure patient is compliant with all medications throughout program.  Explain bronchial hygiene techniques.  Pre/post resting BPs at each session.  60 Day Reassessment: Reviewed effects of exercise on BP: Yes  Reviewed knowledge test: Yes  Smoking cessation provided: Yes  BP remains within target goal,  "compliant with meds. Pt reports no issues at this time.  Carlito states that he has decreased his smoking to \"about half a pack\" per day and will continue to try to decrease.     Individual Patient Goals:  Increase strength and endurance  Be able to do more activity with less SOB.    Goal Status: In Progress    Staff Comments:  60 Day Reassessment: Carlito has completed 11 sessions of pulmonary rehab. Endurance increased to 40 minutes per session and MET levels have remained consistent at a max of 3.0. We will continue to increase duration and resistance as able. Continuing to provide encouragement for smoking cessation.    Rehab Staff Signature: Emmanuel Carlos, RRT  "

## 2025-01-09 ENCOUNTER — CLINICAL SUPPORT (OUTPATIENT)
Dept: CARDIAC REHAB | Facility: HOSPITAL | Age: 75
End: 2025-01-09
Payer: MEDICARE

## 2025-01-09 DIAGNOSIS — J44.9 STAGE 4 VERY SEVERE CHRONIC OBSTRUCTIVE PULMONARY DISEASE BY GLOBAL INITIATIVE FOR CHRONIC OBSTRUCTIVE LUNG DISEASE CLASSIFICATION (MULTI): ICD-10-CM

## 2025-01-09 PROCEDURE — 94625 PHY/QHP OP PULM RHB W/O MNTR: CPT | Performed by: INTERNAL MEDICINE

## 2025-01-11 PROCEDURE — RXMED WILLOW AMBULATORY MEDICATION CHARGE

## 2025-01-14 ENCOUNTER — CLINICAL SUPPORT (OUTPATIENT)
Dept: CARDIAC REHAB | Facility: HOSPITAL | Age: 75
End: 2025-01-14
Payer: MEDICARE

## 2025-01-14 DIAGNOSIS — J44.9 STAGE 4 VERY SEVERE CHRONIC OBSTRUCTIVE PULMONARY DISEASE BY GLOBAL INITIATIVE FOR CHRONIC OBSTRUCTIVE LUNG DISEASE CLASSIFICATION (MULTI): ICD-10-CM

## 2025-01-14 PROCEDURE — 94625 PHY/QHP OP PULM RHB W/O MNTR: CPT | Performed by: INTERNAL MEDICINE

## 2025-01-16 ENCOUNTER — CLINICAL SUPPORT (OUTPATIENT)
Dept: CARDIAC REHAB | Facility: HOSPITAL | Age: 75
End: 2025-01-16
Payer: MEDICARE

## 2025-01-16 DIAGNOSIS — J44.9 STAGE 4 VERY SEVERE CHRONIC OBSTRUCTIVE PULMONARY DISEASE BY GLOBAL INITIATIVE FOR CHRONIC OBSTRUCTIVE LUNG DISEASE CLASSIFICATION (MULTI): ICD-10-CM

## 2025-01-16 PROCEDURE — 94625 PHY/QHP OP PULM RHB W/O MNTR: CPT | Performed by: INTERNAL MEDICINE

## 2025-01-20 ENCOUNTER — PHARMACY VISIT (OUTPATIENT)
Dept: PHARMACY | Facility: CLINIC | Age: 75
End: 2025-01-20
Payer: MEDICARE

## 2025-01-21 ENCOUNTER — APPOINTMENT (OUTPATIENT)
Dept: CARDIAC REHAB | Facility: HOSPITAL | Age: 75
End: 2025-01-21
Payer: MEDICARE

## 2025-01-23 ENCOUNTER — CLINICAL SUPPORT (OUTPATIENT)
Dept: CARDIAC REHAB | Facility: HOSPITAL | Age: 75
End: 2025-01-23
Payer: MEDICARE

## 2025-01-23 DIAGNOSIS — J44.9 STAGE 4 VERY SEVERE CHRONIC OBSTRUCTIVE PULMONARY DISEASE BY GLOBAL INITIATIVE FOR CHRONIC OBSTRUCTIVE LUNG DISEASE CLASSIFICATION (MULTI): ICD-10-CM

## 2025-01-23 PROCEDURE — 94625 PHY/QHP OP PULM RHB W/O MNTR: CPT | Performed by: INTERNAL MEDICINE

## 2025-01-24 ENCOUNTER — PATIENT OUTREACH (OUTPATIENT)
Dept: PRIMARY CARE | Facility: CLINIC | Age: 75
End: 2025-01-24
Payer: MEDICARE

## 2025-01-28 ENCOUNTER — CLINICAL SUPPORT (OUTPATIENT)
Dept: CARDIAC REHAB | Facility: HOSPITAL | Age: 75
End: 2025-01-28
Payer: MEDICARE

## 2025-01-28 DIAGNOSIS — J44.9 STAGE 4 VERY SEVERE CHRONIC OBSTRUCTIVE PULMONARY DISEASE BY GLOBAL INITIATIVE FOR CHRONIC OBSTRUCTIVE LUNG DISEASE CLASSIFICATION (MULTI): ICD-10-CM

## 2025-01-28 PROCEDURE — 94625 PHY/QHP OP PULM RHB W/O MNTR: CPT | Performed by: INTERNAL MEDICINE

## 2025-01-30 ENCOUNTER — CLINICAL SUPPORT (OUTPATIENT)
Dept: CARDIAC REHAB | Facility: HOSPITAL | Age: 75
End: 2025-01-30
Payer: MEDICARE

## 2025-01-30 DIAGNOSIS — J44.9 STAGE 4 VERY SEVERE CHRONIC OBSTRUCTIVE PULMONARY DISEASE BY GLOBAL INITIATIVE FOR CHRONIC OBSTRUCTIVE LUNG DISEASE CLASSIFICATION (MULTI): ICD-10-CM

## 2025-01-30 PROCEDURE — 94625 PHY/QHP OP PULM RHB W/O MNTR: CPT | Performed by: INTERNAL MEDICINE

## 2025-02-03 PROCEDURE — RXMED WILLOW AMBULATORY MEDICATION CHARGE

## 2025-02-04 ENCOUNTER — CLINICAL SUPPORT (OUTPATIENT)
Dept: CARDIAC REHAB | Facility: HOSPITAL | Age: 75
End: 2025-02-04
Payer: MEDICARE

## 2025-02-04 DIAGNOSIS — J44.9 STAGE 4 VERY SEVERE CHRONIC OBSTRUCTIVE PULMONARY DISEASE BY GLOBAL INITIATIVE FOR CHRONIC OBSTRUCTIVE LUNG DISEASE CLASSIFICATION (MULTI): ICD-10-CM

## 2025-02-04 PROCEDURE — 94625 PHY/QHP OP PULM RHB W/O MNTR: CPT | Performed by: INTERNAL MEDICINE

## 2025-02-05 ENCOUNTER — PHARMACY VISIT (OUTPATIENT)
Dept: PHARMACY | Facility: CLINIC | Age: 75
End: 2025-02-05
Payer: MEDICARE

## 2025-02-05 ENCOUNTER — DOCUMENTATION (OUTPATIENT)
Dept: CARDIAC REHAB | Facility: HOSPITAL | Age: 75
End: 2025-02-05
Payer: MEDICARE

## 2025-02-05 NOTE — PROGRESS NOTES
Pulmonary Rehabilitation  90 Day Reassessment    Name: Verner L Blankenship  Medical Record Number: [id number]  YOB: 1950  Age: 74 y.o.    Today’s Date: 2/5/2025  Primary Care Physician: Kirsten Jay DO  Referring Physician: Tim Gil MD  Program Location: [facility]  Primary Diagnosis:   1. Stage 4 very severe chronic obstructive pulmonary disease by Global Initiative for Chronic Obstructive Lung Disease classification (Multi)  Referral to Pulmonary Rehabilitation      Onset/Date of Diagnosis: 2010    Session #: 18    Falls Risk: Low  Psychosocial Assessment  Pre PHQ-9: 0  Post PHQ-9:   Sent PH-Q 9 to MD if score > 20: No; score < 20    Stress Management  Pt reported/currently experiencing stress: No  Patient uses stress management skills: Yes   History of: no history of anxiety or depression  Currently seeing a mental health provider: No  Social Support: Yes  Pre CAT score: 14  Post CAT score:   Learning Assessment:  Learning assessment/barriers: None  Preferred learning method: Auditory, Visual, and Reading handout  Barriers: None  Stages of Change:Action    Psychosocial Plan  Goal Status: In Progress   Psychosocial Goals: Decrease CAT score  Maintain PHQ-9 score.  Learn about living with chronic lung disease.  Learn to use stress management techniques.    Psychosocial Interventions/Education: Educate patient on how to live with chronic lung disease.  Question patient on new or existing stress/anxiety issues at least once every 30 days.  90 Day Reassessment: Patient reports no new stress, anxiety or depression at this time.    Oxygen Assessment  SpO2 at rest: 96 % on RA  SpO2 with exertion: 87 % on RA    Oxygen Use  Supplemental O2 prescribed: No,   Hypoxia managed: Yes   Home Pulse Oximeter: Yes     Pre MMRC: 3  Post MMRC:     Oxygen Plan  Goal Status: In Progress  Oxygen Goals: Decrease MMRC score  Learn and use diaphragmatic breathing as needed.  Learn and use pursed lip  breathing as needed.  Titrate supplemental O2 as needed to keep SpO2 at 88% or greater.    History of WANG?: No    Oxygen Education/Interventions: Learning to use pursed lip breathing during exercise to help lessen dyspnea.  SpO2 checks with pulse oximeter at rest and with each modality.  Supplemental O2 as needed.  Titrate supplemental O2 as needed to keep SpO2 at 88% or greater.  90 Day Reassessment: Pt educated on diaphragmatic and pursed lip breathing techniques: Yes  Continuing to reinforce proper breathing techniques.  Spo2 of 87 or > maintained on RA with exercise.    Nutrition Assessment:  Hyperlipidemia: No   Lipids:   Lab Results   Component Value Date    CHOL 119 03/16/2020    HDL 43.3 03/16/2020    LDLF 68 03/16/2020    TRIG 41 03/16/2020   Current Dietary Guidelines:  None  Barriers to dietary change: no    Diabetes Assessment  Lab Results   Component Value Date    HGBA1C 5.8 (H) 06/06/2024   History of Diabetes: No    Weight Management  Start of rehab weight: 118 lbs  Height: 182.9 cm (6')  Weight: 116.2  BMI (Calculated): 15.8    Nutrition Plan  Goal Status: In Progress  Nutrition Goals: Learn about healthy eating habits.  Maintain current weight.    Nutrition Interventions/Education: Body weight checked weekly.  Discuss fat screener results and recommendations.  60 Day Reassessment: Continuing to re-inforce healthy eating habits for pt.  Fatscreener & AHA H/O on fats reviewed and given to patient: Yes  Weight loss of 1.8 lb since start of rehab.    Exercise Assessment  Home Exercise: No  Mode: NA  Frequency: NA  Duration: NA    6 Minute Walk Assessment   6 MWT distance: 860 ft  FiO2 used during test: RA    Exercise Prescription  Exercise Prescription based on: 6 Minute Walk Test   Frequency:  2 days/week   Mode: Treadmill, Recumbent Cycle, Arm Ergometer, and SciFit   Duration: 20-40 total aerobic minutes   Intensity: RPE 11-13  Target HR:  Rest +30  MET Level: 2+  Patient wears supplemental O2: No    Modality Workload METs Duration (minutes)   1 Pre-Exercise      2 Scifit Stepper 2.0  2.5 10 :00   3 Recumbent Bike #3  3.0 10 :00   4 Arm Ergometer UBE 2.0 2.7 10 :00   5 Treadmill 1.5/1.0  2.4 10 :00   6 Post-Exercise      Resistance Training: Yes   Home Exercise Prescription given: To be given prior to discharge from program.    Exercise Plan  Goal Status: In Progress  Exercise Goals: By discharge:  Increase exercise MET level by 5-10% each week  Increase total exercise duration to 30-45 minutes  Obtain 150 minutes/week of moderate intensity aerobic exercise  Initiate strength training 2-3 days a week  Initiate flexibility training 2-3 days a week  Establish a home exercise program before discharge    Exercise Interventions/Education: SPO2 goal is to add or titrate supplemnetal O2 as necessary at rest/exercise via nasal cannula or mask to prevent <88% SPO2.  Target goal for duration: 30-45 minutes; Increase MET level 5-10% each week or as tolerated.    Physical exercise restrictions: None  90 Day Reassessment: Changes made based on HR and RPE/D responses to exercise.  Received education each week present.  Endurance of 40 minutes per session maintained.  Education Classes Completed: Preventing Infection, Diabetes, Risk Factors, Using an Inhaler, Breathing Techniques, How to Live with COPD, Anxiety, Stress and Depression, Bronchial Hygeine, Nutrition, and Smoking Cessation    Other Core Components/Risk Factor Assessment:  Medication adherence  Current Medications:   Current Outpatient Medications on File Prior to Visit   Medication Sig Dispense Refill    albuterol (Ventolin HFA) 90 mcg/actuation inhaler inhale 2 puffs by mouth every 4 hours as needed for shortness of breath 18 g 11    amLODIPine (Norvasc) 5 mg tablet Take 1 tablet (5 mg) by mouth once daily. 90 tablet 3    ferrous gluconate 236 mg (27 mg iron) tablet Take 1 tablet (27 mg) by mouth every other day.      fluticasone propion-salmeteroL (Wixela Inhub)  250-50 mcg/dose diskus inhaler Inhale 1 puff 2 times a day. Rinse mouth with water after use to reduce aftertaste and incidence of candidiasis. Do not swallow. 60 each 11    ipratropium-albuteroL (Duo-Neb) 0.5-2.5 mg/3 mL nebulizer solution Take 3 mL by nebulization every 4 hours if needed for shortness of breath or wheezing. 360 mL 5    magnesium aspart,citrate,oxide 400 mg magnesium capsule Take 1 capsule by mouth once daily.      pantoprazole (ProtoNix) 40 mg EC tablet Take 1 tablet (40 mg) by mouth once daily. 90 tablet 3    tamsulosin (Flomax) 0.4 mg 24 hr capsule take 1 capsule by mouth once daily 90 capsule 3    umeclidinium (Incruse Ellipta) 62.5 mcg/actuation inhalation Inhale 1 puff (62.5 mcg) once daily. 30 each 11     No current facility-administered medications on file prior to visit.               Medication compliance: Yes   Uses pill box/organizer: Yes    Carries medication list: Yes    Uses Inhalers appropriately: Yes     Blood Pressure Management  History of Hypertension: Yes   Medication Changes: No   Resting BP: 106/72    Heart Failure Management  Hx of Heart Failure: No    Smoking/Tobacco Assessment  Social History     Tobacco Use   Smoking Status Every Day    Current packs/day: 1.00    Average packs/day: 1 pack/day for 60.1 years (60.1 ttl pk-yrs)    Types: Cigarettes    Start date: 1965   Smokeless Tobacco Never     Other Core Component Plan  Goal Status: In Progress  Other Core Component Goals: Learn to manage bronchial hygiene  Resting BP < 160/90.  Use all medications properly and as prescribed.  Complete smoking cessation.    Frequent Cough?: No  Educational Assessment:  Pulmonary Knowledge Test:   Intake: 9/15  Discharge:     Other Core Component Interventions/Education: Ensure patient is compliant with all medications throughout program.  Explain bronchial hygiene techniques.  Pre/post resting BPs at each session.  90 Day Reassessment: Reviewed effects of exercise on BP: Yes  Reviewed  "knowledge test: Yes  Smoking cessation provided: Yes  BP remains within target goal, compliant with meds. Pt reports no issues at this time.  Carlito states that he continues to smoke \"about half a pack\" per day and will continue to try to decrease.     Individual Patient Goals:  Increase strength and endurance  Be able to do more activity with less SOB.    Goal Status: In Progress    Staff Comments:  90 Day Reassessment: Carlito has completed 18 sessions of pulmonary rehab. Endurance of 40 minutes per session maintained and MET levels have remained consistent at a max of 3.0. We will continue to increase resistance as able. Continuing to provide encouragement for smoking cessation.    Rehab Staff Signature: Emmanuel Carlos, RRT  "

## 2025-02-06 ENCOUNTER — APPOINTMENT (OUTPATIENT)
Dept: CARDIAC REHAB | Facility: HOSPITAL | Age: 75
End: 2025-02-06
Payer: MEDICARE

## 2025-02-11 ENCOUNTER — CLINICAL SUPPORT (OUTPATIENT)
Dept: CARDIAC REHAB | Facility: HOSPITAL | Age: 75
End: 2025-02-11
Payer: MEDICARE

## 2025-02-11 DIAGNOSIS — J44.9 STAGE 4 VERY SEVERE CHRONIC OBSTRUCTIVE PULMONARY DISEASE BY GLOBAL INITIATIVE FOR CHRONIC OBSTRUCTIVE LUNG DISEASE CLASSIFICATION (MULTI): ICD-10-CM

## 2025-02-11 PROCEDURE — 94625 PHY/QHP OP PULM RHB W/O MNTR: CPT | Performed by: INTERNAL MEDICINE

## 2025-02-12 PROCEDURE — RXMED WILLOW AMBULATORY MEDICATION CHARGE

## 2025-02-13 ENCOUNTER — CLINICAL SUPPORT (OUTPATIENT)
Dept: CARDIAC REHAB | Facility: HOSPITAL | Age: 75
End: 2025-02-13
Payer: MEDICARE

## 2025-02-13 DIAGNOSIS — J44.9 STAGE 4 VERY SEVERE CHRONIC OBSTRUCTIVE PULMONARY DISEASE BY GLOBAL INITIATIVE FOR CHRONIC OBSTRUCTIVE LUNG DISEASE CLASSIFICATION (MULTI): ICD-10-CM

## 2025-02-13 PROCEDURE — 94625 PHY/QHP OP PULM RHB W/O MNTR: CPT | Performed by: INTERNAL MEDICINE

## 2025-02-14 ENCOUNTER — PHARMACY VISIT (OUTPATIENT)
Dept: PHARMACY | Facility: CLINIC | Age: 75
End: 2025-02-14
Payer: MEDICARE

## 2025-02-18 ENCOUNTER — CLINICAL SUPPORT (OUTPATIENT)
Dept: CARDIAC REHAB | Facility: HOSPITAL | Age: 75
End: 2025-02-18
Payer: MEDICARE

## 2025-02-18 DIAGNOSIS — J44.9 STAGE 4 VERY SEVERE CHRONIC OBSTRUCTIVE PULMONARY DISEASE BY GLOBAL INITIATIVE FOR CHRONIC OBSTRUCTIVE LUNG DISEASE CLASSIFICATION (MULTI): ICD-10-CM

## 2025-02-18 PROCEDURE — 94625 PHY/QHP OP PULM RHB W/O MNTR: CPT | Performed by: INTERNAL MEDICINE

## 2025-02-20 ENCOUNTER — APPOINTMENT (OUTPATIENT)
Dept: CARDIAC REHAB | Facility: HOSPITAL | Age: 75
End: 2025-02-20
Payer: MEDICARE

## 2025-02-24 ENCOUNTER — HOSPITAL ENCOUNTER (OUTPATIENT)
Dept: RADIOLOGY | Facility: CLINIC | Age: 75
Discharge: HOME | End: 2025-02-24
Payer: MEDICARE

## 2025-02-24 DIAGNOSIS — F17.210 CIGARETTE NICOTINE DEPENDENCE WITHOUT COMPLICATION: ICD-10-CM

## 2025-02-24 PROCEDURE — 71250 CT THORAX DX C-: CPT

## 2025-02-24 PROCEDURE — 76380 CAT SCAN FOLLOW-UP STUDY: CPT | Performed by: RADIOLOGY

## 2025-02-25 ENCOUNTER — APPOINTMENT (OUTPATIENT)
Dept: CARDIAC REHAB | Facility: HOSPITAL | Age: 75
End: 2025-02-25
Payer: MEDICARE

## 2025-02-27 ENCOUNTER — APPOINTMENT (OUTPATIENT)
Dept: CARDIAC REHAB | Facility: HOSPITAL | Age: 75
End: 2025-02-27
Payer: MEDICARE

## 2025-02-28 ENCOUNTER — TELEPHONE (OUTPATIENT)
Dept: PULMONOLOGY | Facility: HOSPITAL | Age: 75
End: 2025-02-28
Payer: MEDICARE

## 2025-02-28 NOTE — TELEPHONE ENCOUNTER
Patient acknowledged understanding. All questions answered at this time.     ----- Message from Fidelia Wilks sent at 2/28/2025 10:09 AM EST -----  Please call the patient and let him know that his lung nodules are stable and slightly decreased in size, we will now get a CT chest for him in 6 months for follow up. Will order this at his follow up.

## 2025-03-04 ENCOUNTER — CLINICAL SUPPORT (OUTPATIENT)
Dept: CARDIAC REHAB | Facility: HOSPITAL | Age: 75
End: 2025-03-04
Payer: MEDICARE

## 2025-03-04 DIAGNOSIS — J44.9 STAGE 4 VERY SEVERE CHRONIC OBSTRUCTIVE PULMONARY DISEASE BY GLOBAL INITIATIVE FOR CHRONIC OBSTRUCTIVE LUNG DISEASE CLASSIFICATION (MULTI): ICD-10-CM

## 2025-03-04 PROCEDURE — 94625 PHY/QHP OP PULM RHB W/O MNTR: CPT | Performed by: INTERNAL MEDICINE

## 2025-03-05 ENCOUNTER — DOCUMENTATION (OUTPATIENT)
Dept: CARDIAC REHAB | Facility: HOSPITAL | Age: 75
End: 2025-03-05
Payer: MEDICARE

## 2025-03-05 PROCEDURE — RXMED WILLOW AMBULATORY MEDICATION CHARGE

## 2025-03-05 NOTE — PROGRESS NOTES
Pulmonary Rehabilitation  120 Day Reassessment    Name: Verner L Blankenship  Medical Record Number: [id number]  YOB: 1950  Age: 74 y.o.    Today’s Date: 3/5/2025  Primary Care Physician: Kirsten Jay DO  Referring Physician: Tim Gil MD  Program Location: [facility]  Primary Diagnosis:   1. Stage 4 very severe chronic obstructive pulmonary disease by Global Initiative for Chronic Obstructive Lung Disease classification (Multi)  Referral to Pulmonary Rehabilitation      Onset/Date of Diagnosis: 2010    Session #: 22    Falls Risk: Low  Psychosocial Assessment  Pre PHQ-9: 0  Post PHQ-9:   Sent PH-Q 9 to MD if score > 20: No; score < 20    Stress Management  Pt reported/currently experiencing stress: No  Patient uses stress management skills: Yes   History of: no history of anxiety or depression  Currently seeing a mental health provider: No  Social Support: Yes  Pre CAT score: 14  Post CAT score:   Learning Assessment:  Learning assessment/barriers: None  Preferred learning method: Auditory, Visual, and Reading handout  Barriers: None  Stages of Change:Action    Psychosocial Plan  Goal Status: In Progress   Psychosocial Goals: Decrease CAT score  Maintain PHQ-9 score.  Learn about living with chronic lung disease.  Learn to use stress management techniques.    Psychosocial Interventions/Education: Educate patient on how to live with chronic lung disease.  Question patient on new or existing stress/anxiety issues at least once every 30 days.  120 Day Reassessment: Patient reports no new stress, anxiety or depression at this time.    Oxygen Assessment  SpO2 at rest: 96 % on RA  SpO2 with exertion: 87 % on RA    Oxygen Use  Supplemental O2 prescribed: No,   Hypoxia managed: Yes   Home Pulse Oximeter: Yes     Pre MMRC: 3  Post MMRC:     Oxygen Plan  Goal Status: In Progress  Oxygen Goals: Decrease MMRC score  Learn and use diaphragmatic breathing as needed.  Learn and use pursed lip  breathing as needed.  Titrate supplemental O2 as needed to keep SpO2 at 88% or greater.    History of WANG?: No    Oxygen Education/Interventions: Learning to use pursed lip breathing during exercise to help lessen dyspnea.  SpO2 checks with pulse oximeter at rest and with each modality.  Supplemental O2 as needed.  Titrate supplemental O2 as needed to keep SpO2 at 88% or greater.  120 Day Reassessment: Pt educated on diaphragmatic and pursed lip breathing techniques: Yes  Continuing to reinforce proper breathing techniques.  Spo2 of 87 or > maintained on RA with exercise.    Nutrition Assessment:  Hyperlipidemia: No   Lipids:   Lab Results   Component Value Date    CHOL 119 03/16/2020    HDL 43.3 03/16/2020    LDLF 68 03/16/2020    TRIG 41 03/16/2020   Current Dietary Guidelines:  None  Barriers to dietary change: no    Diabetes Assessment  Lab Results   Component Value Date    HGBA1C 5.8 (H) 06/06/2024   History of Diabetes: No    Weight Management  Start of rehab weight: 118 lbs  Height: 182.9 cm (6')  Weight: 114 lbs  BMI (Calculated): 15.5    Nutrition Plan  Goal Status: In Progress  Nutrition Goals: Learn about healthy eating habits.  Maintain current weight.    Nutrition Interventions/Education: Body weight checked weekly.  Discuss fat screener results and recommendations.  120 Day Reassessment: Continuing to re-inforce healthy eating habits for pt.  Fatscreener & AHA H/O on fats reviewed and given to patient: Yes  Weight loss of 4 lb since start of rehab.    Exercise Assessment  Home Exercise: No  Mode: NA  Frequency: NA  Duration: NA    6 Minute Walk Assessment   6 MWT distance: 860 ft  FiO2 used during test: RA    Exercise Prescription  Exercise Prescription based on: 6 Minute Walk Test   Frequency:  2 days/week   Mode: Treadmill, Recumbent Cycle, Arm Ergometer, and SciFit   Duration: 20-40 total aerobic minutes   Intensity: RPE 11-13  Target HR:  Rest +30  MET Level: 2+  Patient wears supplemental O2: No    Modality Workload METs Duration (minutes)   1 Pre-Exercise      2 Scifit Stepper 2.5 2.9 10 :00   3 Recumbent Bike #4 3.0 10 :00   4 Arm Ergometer UBE 2.5 3.1 10 :00   5 Treadmill 1.5/1.5 2.5 10 :00   6 Post-Exercise      Resistance Training: Yes   Home Exercise Prescription given: To be given prior to discharge from program.    Exercise Plan  Goal Status: In Progress  Exercise Goals: By discharge:  Increase exercise MET level by 5-10% each week  Increase total exercise duration to 30-45 minutes  Obtain 150 minutes/week of moderate intensity aerobic exercise  Initiate strength training 2-3 days a week  Initiate flexibility training 2-3 days a week  Establish a home exercise program before discharge    Exercise Interventions/Education: SPO2 goal is to add or titrate supplemnetal O2 as necessary at rest/exercise via nasal cannula or mask to prevent <88% SPO2.  Target goal for duration: 30-45 minutes; Increase MET level 5-10% each week or as tolerated.    Physical exercise restrictions: None  120 Day Reassessment: Changes made based on HR and RPE/D responses to exercise.  Received education each week present.  Endurance of 40 minutes per session maintained.  Education Classes Completed: Preventing Infection, Diabetes, Risk Factors, Using an Inhaler, Breathing Techniques, How to Live with COPD, Anxiety, Stress and Depression, Bronchial Hygeine, Nutrition, Smoking Cessation, and Exercise    Other Core Components/Risk Factor Assessment:  Medication adherence  Current Medications:   Current Outpatient Medications on File Prior to Visit   Medication Sig Dispense Refill    albuterol (Ventolin HFA) 90 mcg/actuation inhaler inhale 2 puffs by mouth every 4 hours as needed for shortness of breath 18 g 11    amLODIPine (Norvasc) 5 mg tablet Take 1 tablet (5 mg) by mouth once daily. 90 tablet 3    ferrous gluconate 236 mg (27 mg iron) tablet Take 1 tablet (27 mg) by mouth every other day.      fluticasone propion-salmeteroL  (Wixela Inhub) 250-50 mcg/dose diskus inhaler Inhale 1 puff 2 times a day. Rinse mouth with water after use to reduce aftertaste and incidence of candidiasis. Do not swallow. 60 each 11    ipratropium-albuteroL (Duo-Neb) 0.5-2.5 mg/3 mL nebulizer solution Take 3 mL by nebulization every 4 hours if needed for shortness of breath or wheezing. 360 mL 5    magnesium aspart,citrate,oxide 400 mg magnesium capsule Take 1 capsule by mouth once daily.      pantoprazole (ProtoNix) 40 mg EC tablet Take 1 tablet (40 mg) by mouth once daily. 90 tablet 3    tamsulosin (Flomax) 0.4 mg 24 hr capsule take 1 capsule by mouth once daily 90 capsule 3    umeclidinium (Incruse Ellipta) 62.5 mcg/actuation inhalation Inhale 1 puff (62.5 mcg) once daily. 30 each 11     No current facility-administered medications on file prior to visit.               Medication compliance: Yes   Uses pill box/organizer: Yes    Carries medication list: Yes    Uses Inhalers appropriately: Yes     Blood Pressure Management  History of Hypertension: Yes   Medication Changes: No   Resting BP: 105/72    Heart Failure Management  Hx of Heart Failure: No    Smoking/Tobacco Assessment  Social History     Tobacco Use   Smoking Status Every Day    Current packs/day: 1.00    Average packs/day: 1 pack/day for 60.2 years (60.2 ttl pk-yrs)    Types: Cigarettes    Start date: 1965   Smokeless Tobacco Never     Other Core Component Plan  Goal Status: In Progress  Other Core Component Goals: Learn to manage bronchial hygiene  Resting BP < 160/90.  Use all medications properly and as prescribed.  Complete smoking cessation.    Frequent Cough?: No  Educational Assessment:  Pulmonary Knowledge Test:   Intake: 9/15  Discharge:     Other Core Component Interventions/Education: Ensure patient is compliant with all medications throughout program.  Explain bronchial hygiene techniques.  Pre/post resting BPs at each session.  120 Day Reassessment: Reviewed effects of exercise on BP:  "Yes  Reviewed knowledge test: Yes  Smoking cessation provided: Yes  BP remains within target goal, compliant with meds. Pt reports no issues at this time.  Carlito states that he continues to smoke \"about half a pack\" per day and will continue to try to decrease.     Individual Patient Goals:  Increase strength and endurance  Be able to do more activity with less SOB.    Goal Status: In Progress    Staff Comments:  120 Day Reassessment: Carlito has completed 22 sessions of pulmonary rehab. Endurance of 40 minutes per session maintained and MET levels have increased to a max of 3.1. We will continue to increase resistance as able. Continuing to provide encouragement for smoking cessation, he states that his smoking has varied from 4-10 per day depending on the week.    Rehab Staff Signature: Emmanuel Carlos, RRT  "

## 2025-03-06 ENCOUNTER — PHARMACY VISIT (OUTPATIENT)
Dept: PHARMACY | Facility: CLINIC | Age: 75
End: 2025-03-06
Payer: MEDICARE

## 2025-03-06 ENCOUNTER — CLINICAL SUPPORT (OUTPATIENT)
Dept: CARDIAC REHAB | Facility: HOSPITAL | Age: 75
End: 2025-03-06
Payer: MEDICARE

## 2025-03-06 DIAGNOSIS — J44.9 STAGE 4 VERY SEVERE CHRONIC OBSTRUCTIVE PULMONARY DISEASE BY GLOBAL INITIATIVE FOR CHRONIC OBSTRUCTIVE LUNG DISEASE CLASSIFICATION (MULTI): ICD-10-CM

## 2025-03-06 PROCEDURE — 94625 PHY/QHP OP PULM RHB W/O MNTR: CPT | Performed by: INTERNAL MEDICINE

## 2025-03-11 ENCOUNTER — APPOINTMENT (OUTPATIENT)
Dept: PULMONOLOGY | Facility: HOSPITAL | Age: 75
End: 2025-03-11
Payer: MEDICARE

## 2025-03-11 ENCOUNTER — CLINICAL SUPPORT (OUTPATIENT)
Dept: CARDIAC REHAB | Facility: HOSPITAL | Age: 75
End: 2025-03-11
Payer: MEDICARE

## 2025-03-11 DIAGNOSIS — J44.9 STAGE 4 VERY SEVERE CHRONIC OBSTRUCTIVE PULMONARY DISEASE BY GLOBAL INITIATIVE FOR CHRONIC OBSTRUCTIVE LUNG DISEASE CLASSIFICATION (MULTI): ICD-10-CM

## 2025-03-11 PROCEDURE — 94625 PHY/QHP OP PULM RHB W/O MNTR: CPT | Performed by: INTERNAL MEDICINE

## 2025-03-13 ENCOUNTER — CLINICAL SUPPORT (OUTPATIENT)
Dept: CARDIAC REHAB | Facility: HOSPITAL | Age: 75
End: 2025-03-13
Payer: MEDICARE

## 2025-03-13 DIAGNOSIS — J44.9 STAGE 4 VERY SEVERE CHRONIC OBSTRUCTIVE PULMONARY DISEASE BY GLOBAL INITIATIVE FOR CHRONIC OBSTRUCTIVE LUNG DISEASE CLASSIFICATION (MULTI): ICD-10-CM

## 2025-03-13 PROCEDURE — 94625 PHY/QHP OP PULM RHB W/O MNTR: CPT | Performed by: INTERNAL MEDICINE

## 2025-03-14 PROCEDURE — RXMED WILLOW AMBULATORY MEDICATION CHARGE

## 2025-03-17 ENCOUNTER — PHARMACY VISIT (OUTPATIENT)
Dept: PHARMACY | Facility: CLINIC | Age: 75
End: 2025-03-17
Payer: MEDICARE

## 2025-03-18 ENCOUNTER — APPOINTMENT (OUTPATIENT)
Dept: PULMONOLOGY | Facility: HOSPITAL | Age: 75
End: 2025-03-18
Payer: MEDICARE

## 2025-03-18 ENCOUNTER — CLINICAL SUPPORT (OUTPATIENT)
Dept: CARDIAC REHAB | Facility: HOSPITAL | Age: 75
End: 2025-03-18
Payer: MEDICARE

## 2025-03-18 VITALS
WEIGHT: 117 LBS | RESPIRATION RATE: 16 BRPM | BODY MASS INDEX: 16.75 KG/M2 | SYSTOLIC BLOOD PRESSURE: 116 MMHG | HEART RATE: 89 BPM | DIASTOLIC BLOOD PRESSURE: 78 MMHG | OXYGEN SATURATION: 96 % | HEIGHT: 70 IN

## 2025-03-18 DIAGNOSIS — J44.9 STAGE 4 VERY SEVERE CHRONIC OBSTRUCTIVE PULMONARY DISEASE BY GLOBAL INITIATIVE FOR CHRONIC OBSTRUCTIVE LUNG DISEASE CLASSIFICATION (MULTI): Primary | ICD-10-CM

## 2025-03-18 DIAGNOSIS — J44.9 STAGE 4 VERY SEVERE CHRONIC OBSTRUCTIVE PULMONARY DISEASE BY GLOBAL INITIATIVE FOR CHRONIC OBSTRUCTIVE LUNG DISEASE CLASSIFICATION (MULTI): ICD-10-CM

## 2025-03-18 DIAGNOSIS — F17.210 CIGARETTE NICOTINE DEPENDENCE WITHOUT COMPLICATION: ICD-10-CM

## 2025-03-18 PROCEDURE — 99213 OFFICE O/P EST LOW 20 MIN: CPT | Performed by: NURSE PRACTITIONER

## 2025-03-18 PROCEDURE — 1123F ACP DISCUSS/DSCN MKR DOCD: CPT | Performed by: NURSE PRACTITIONER

## 2025-03-18 PROCEDURE — RXMED WILLOW AMBULATORY MEDICATION CHARGE

## 2025-03-18 PROCEDURE — 4004F PT TOBACCO SCREEN RCVD TLK: CPT | Performed by: NURSE PRACTITIONER

## 2025-03-18 PROCEDURE — 3074F SYST BP LT 130 MM HG: CPT | Performed by: NURSE PRACTITIONER

## 2025-03-18 PROCEDURE — 3078F DIAST BP <80 MM HG: CPT | Performed by: NURSE PRACTITIONER

## 2025-03-18 PROCEDURE — 1159F MED LIST DOCD IN RCRD: CPT | Performed by: NURSE PRACTITIONER

## 2025-03-18 PROCEDURE — 3008F BODY MASS INDEX DOCD: CPT | Performed by: NURSE PRACTITIONER

## 2025-03-18 PROCEDURE — 94625 PHY/QHP OP PULM RHB W/O MNTR: CPT | Performed by: INTERNAL MEDICINE

## 2025-03-18 RX ORDER — BUDESONIDE, GLYCOPYRROLATE, AND FORMOTEROL FUMARATE 160; 9; 4.8 UG/1; UG/1; UG/1
2 AEROSOL, METERED RESPIRATORY (INHALATION)
Qty: 10.7 G | Refills: 11 | Status: SHIPPED | OUTPATIENT
Start: 2025-03-18 | End: 2025-03-18 | Stop reason: SDUPTHER

## 2025-03-18 RX ORDER — BUDESONIDE, GLYCOPYRROLATE, AND FORMOTEROL FUMARATE 160; 9; 4.8 UG/1; UG/1; UG/1
2 AEROSOL, METERED RESPIRATORY (INHALATION) 2 TIMES DAILY
Qty: 32.1 G | Refills: 3 | Status: SHIPPED | OUTPATIENT
Start: 2025-03-18

## 2025-03-18 ASSESSMENT — ENCOUNTER SYMPTOMS
COUGH: 1
FEVER: 0
SHORTNESS OF BREATH: 1
CHILLS: 0
UNEXPECTED WEIGHT CHANGE: 0
FATIGUE: 0
RHINORRHEA: 0
WHEEZING: 0

## 2025-03-18 NOTE — PATIENT INSTRUCTIONS
Continue on generic Advair 1 puff twice a day, everyday.   Continue on Incruse one puff once a day. I will check to see if Breztri is covered by your insurance.   Continue albuterol and nebulizer as needed.  Start on Ohtuvayre nebulizer twice a day, everyday when you get this.   Please get CT scan of your chest for lung cancer screening in the end of August for follow up.   Call with any questions or concerns.  Follow up with me in September.

## 2025-03-18 NOTE — PROGRESS NOTES
Patient switched to Breztri inhaler per request by pulmonologist. He was doing triple therapy with Wixela and Incruse, however he had a free sample of Breztri and it worked better for him and is a more simplified regimen. Patient counseled on proper inhaler use, mechanism, and the importance of rinsing mouth out after use.     Carol Ledesma, PharmD  Clinical Pharmacy Specialist

## 2025-03-18 NOTE — PROGRESS NOTES
"Subjective   Patient ID: Verner L Blankenship \"Navjot" is a 74 y.o. male who presents for follow up COPD.     HPI: Patient has PMH of COPD, HTN, GERD and BPH. Here for pulmonary follow-up for COPD. He was diagnosed with COPD many years ago. He has BAUTISTA and cough, denies sputum production. Has wheezing, worse with activity such as mowing his lawn. He denies SOB at rest, chest pain/tightness, nasal congestion/post-nasal drip. He has GERD, is controlled on protonix.     Today he is here for follow up. He states that his breathing has been stable on Wixela and Incruse. He was given a sample of Breztri from a friend and states he has had improvement on this and requesting to change to this. He goes through our pharmacy program. He gets shortness of breath on exertion and has a daily productive cough. He uses albuterol 4-6 times per day. He is smoking at 1/2 ppd.     Review of Systems   Constitutional:  Negative for chills, fatigue, fever and unexpected weight change.   HENT:  Negative for congestion, postnasal drip and rhinorrhea.    Respiratory:  Positive for cough (denies hemoptysis.) and shortness of breath. Negative for wheezing.    Cardiovascular:  Negative for chest pain and leg swelling.   All other systems reviewed and are negative.      Objective   Physical Exam  Vitals reviewed.   Constitutional:       Appearance: Normal appearance.   HENT:      Head: Normocephalic.   Cardiovascular:      Rate and Rhythm: Normal rate and regular rhythm.   Pulmonary:      Effort: Pulmonary effort is normal.      Breath sounds: Decreased breath sounds present.   Skin:     General: Skin is warm and dry.   Neurological:      Mental Status: He is alert.       Assessment/Plan   COPD  Nicotine dependence   GERD    Plan:    -COPD (GOLD 4) - PFT 9/3/24 with severe obstruction, FEV1 30%, no BD response.  -Continue on Wixela and Incruse. Will check with pharmacist if his insurance will cover Breztri, if so okay to change.   -Recommend " starting on Ohtuvayre due to uncontrolled COPD.   -Continue duoneb or albuterol MDI q4-6hr PRN SOB or wheezing  -he completed pulmonary rehab  -He is a current smoker at 1/2 ppd but mostly smoked 1 PPD since age 6, ~68 pack year history   -offered pharmacologic options to assist with quitting: ordered nicotine patches and nicotine lozenges  -LDCT 8/8/24 with 10 mm irregular nodular density at R apex, new 9 x 5 mm subpleural RUL nodule, 8 mm RLL nodule. had already reviewed results with patient via telephone and ordered repeat LDCT to be done in 3 months from previous, he has it scheduled for 11/22/24. This showed decrease in size of a RUL nodule but a new ground glass nodule in the LLL likely inflammatory and 3 month follow up recommended, I ordered this for end of February. Thsi was done and showed resolution of nodule and also some interval decrease in other nodules. Recommendation is to now continue with  in 6 months, order placed for August.     Overall I will optimize management for him and get follow up CT in August. I will see him back in September. I instructed patient to call sooner if needed.      Total time:  25 min.

## 2025-03-20 ENCOUNTER — PHARMACY VISIT (OUTPATIENT)
Dept: PHARMACY | Facility: CLINIC | Age: 75
End: 2025-03-20
Payer: MEDICARE

## 2025-03-20 ENCOUNTER — CLINICAL SUPPORT (OUTPATIENT)
Dept: CARDIAC REHAB | Facility: HOSPITAL | Age: 75
End: 2025-03-20
Payer: MEDICARE

## 2025-03-20 DIAGNOSIS — J44.9 STAGE 4 VERY SEVERE CHRONIC OBSTRUCTIVE PULMONARY DISEASE BY GLOBAL INITIATIVE FOR CHRONIC OBSTRUCTIVE LUNG DISEASE CLASSIFICATION (MULTI): ICD-10-CM

## 2025-03-20 PROCEDURE — 94625 PHY/QHP OP PULM RHB W/O MNTR: CPT | Performed by: INTERNAL MEDICINE

## 2025-03-25 ENCOUNTER — CLINICAL SUPPORT (OUTPATIENT)
Dept: CARDIAC REHAB | Facility: HOSPITAL | Age: 75
End: 2025-03-25
Payer: MEDICARE

## 2025-03-25 ENCOUNTER — APPOINTMENT (OUTPATIENT)
Dept: PHARMACY | Facility: HOSPITAL | Age: 75
End: 2025-03-25
Payer: MEDICARE

## 2025-03-25 DIAGNOSIS — J44.9 STAGE 4 VERY SEVERE CHRONIC OBSTRUCTIVE PULMONARY DISEASE BY GLOBAL INITIATIVE FOR CHRONIC OBSTRUCTIVE LUNG DISEASE CLASSIFICATION (MULTI): ICD-10-CM

## 2025-03-25 DIAGNOSIS — J44.9 COPD, MODERATE (MULTI): Chronic | ICD-10-CM

## 2025-03-25 PROCEDURE — 94625 PHY/QHP OP PULM RHB W/O MNTR: CPT | Performed by: INTERNAL MEDICINE

## 2025-03-25 NOTE — PROGRESS NOTES
"  Pharmacist Clinic: Pulmonary Management    Verner L Blankenship \"Carlito\" is a 74 y.o. male was referred to Clinical Pharmacy Team for Pulmonary assessment.   Referring Provider: Zuleika Powers APRN*  Last visit: 3/18/2025  Next visit: 9/16/2025    Subjective   No Known Allergies    Patient referred for cost assistance with inhalers. Was previously on Trelegy, however stopped it due to cost and ineffectiveness. Patient now on Breztri.    PULMONARY ASSESSMENT  Patient has been diagnosed with: COPD    Current Regimen:  Breztri 2 puffs BID  Albuterol PRN (nebs and inhaler)    Historical Treatment:  Incruse 1 puff daily   Wixela 1 puff BID  Trelegy - stopped due to cost and said it didn't last all day     Symptom Management:  Current symptoms: dyspnea, cough, and wheezing  Triggers: mostly in the mornings, exertion   Alleviating factors: inhaler     Exacerbation Hx:  When was your last hospitalization for an exacerbation? 7/19/24  When was the last time you were treated with antibiotics and/or steroids? 7/19/24 - azithromycin and prednisone      Rescue Inhaler Use:  How often do you use your rescue inhaler? Every day, but less than before    Smoking history  - He has a history of 68 pack years. (1 PPD x 68 years) - current smoker       Objective   There were no vitals taken for this visit.    Secondary Prevention (vaccines):  -Influenza: gets yearly  -PCV13: unknown  -PPSV23: 1/8/2019  -PCV20: 6/6/2024  -COVID: 10/24/2023  -RSV: recommended   -TDAP: unknown  -Shingrix: unknown     Pulmonary Functions Testing Results:  No results found for: \"FEV1\", \"FVC\", \"ITX6SIN\", \"TLC\", \"DLCO\"    Lab Review  Lab Results   Component Value Date    BILITOT 0.3 10/29/2024    CALCIUM 8.2 (L) 10/29/2024    CO2 19 (L) 10/29/2024     10/29/2024    CREATININE 0.97 10/29/2024    GLUCOSE 96 10/29/2024    ALKPHOS 77 10/29/2024    K 4.3 10/29/2024    PROT 6.5 10/29/2024     10/29/2024    AST 34 10/29/2024    ALT 14 10/29/2024    BUN " 21 10/29/2024    ANIONGAP 20 10/29/2024    MG 1.72 10/29/2024    PHOS 4.5 10/29/2024    ALBUMIN 3.9 10/29/2024    GFRMALE >90 05/22/2023     Hemoglobin   Date Value Ref Range Status   10/29/2024 11.9 (L) 13.5 - 17.5 g/dL Final     WBC   Date Value Ref Range Status   10/29/2024 10.2 4.4 - 11.3 x10*3/uL Final     Platelets   Date Value Ref Range Status   10/29/2024 292 150 - 450 x10*3/uL Final       The ASCVD Risk score (Meghan DK, et al., 2019) failed to calculate for the following reasons:    Cannot find a previous HDL lab    Cannot find a previous total cholesterol lab    Current Outpatient Medications   Medication Instructions    albuterol (Ventolin HFA) 90 mcg/actuation inhaler 2 puffs, inhalation, Every 4 hours PRN    amLODIPine (NORVASC) 5 mg, oral, Daily    budesonide-glycopyr-formoterol (Breztri Aerosphere) 160-9-4.8 mcg/actuation HFA aerosol inhaler 2 puffs, inhalation, 2 times daily    ferrous gluconate 236 mg (27 mg iron) tablet 1 tablet, Every other day    ipratropium-albuteroL (Duo-Neb) 0.5-2.5 mg/3 mL nebulizer solution 3 mL, nebulization, Every 4 hours PRN    magnesium aspart,citrate,oxide 400 mg magnesium capsule 1 capsule, Daily    pantoprazole (PROTONIX) 40 mg, oral, Daily    tamsulosin (FLOMAX) 0.4 mg, oral, Daily       Drug Interactions:  None requiring intervention    Affordability/Accessibility:   Patient Assistance for Breztri approved through 9/10/2025. Will have to be renewed prior to that date to prevent lapse in coverage. Medication(s) will be received at no cost to patient from Pending sale to [facility].     Assessment/Plan   Problem List Items Addressed This Visit    None  Visit Diagnoses       COPD, moderate (Multi)  (Chronic)       Relevant Orders    Referral to Clinical Pharmacy          ASSESSMENT:  Patient switched to Breztri inhaler per request by pulmonologist. He was doing triple therapy with Wixela and Incruse, however he had a free sample of Breztri and it worked better for him and  is a more simplified regimen. Patient counseled on proper inhaler use, mechanism, and the importance of rinsing mouth out after use.     PLAN:  CONTINUE:  Breztri 2 puffs BID  Albuterol PRN     Clinical Pharmacy Follow-Up: 6 months     Carol Ledesma PharmD  Clinical Pharmacy Specialist  [phone]    Continue all meds under the continuation of care with the referring provider and clinical pharmacy team.    Verbal consent to manage patient's drug therapy was obtained from the patient. They were informed they may decline to participate or withdraw from participation in pharmacy services at any time.

## 2025-03-26 ENCOUNTER — TELEPHONE (OUTPATIENT)
Dept: PULMONOLOGY | Facility: HOSPITAL | Age: 75
End: 2025-03-26
Payer: MEDICARE

## 2025-03-26 NOTE — TELEPHONE ENCOUNTER
Patient called and attempted to get set up with PAP through [employer], he does not qualify because he has not hit his out of pocket costs for the year. Advised he call Ohtuvayre back when he hits his deductible so he can get this set up. Patient was agreeable to treatment plan and acknowledged understanding.

## 2025-03-27 ENCOUNTER — CLINICAL SUPPORT (OUTPATIENT)
Dept: CARDIAC REHAB | Facility: HOSPITAL | Age: 75
End: 2025-03-27
Payer: MEDICARE

## 2025-03-27 DIAGNOSIS — J44.9 STAGE 4 VERY SEVERE CHRONIC OBSTRUCTIVE PULMONARY DISEASE BY GLOBAL INITIATIVE FOR CHRONIC OBSTRUCTIVE LUNG DISEASE CLASSIFICATION (MULTI): ICD-10-CM

## 2025-03-27 PROCEDURE — 94625 PHY/QHP OP PULM RHB W/O MNTR: CPT | Performed by: INTERNAL MEDICINE

## 2025-04-01 ENCOUNTER — CLINICAL SUPPORT (OUTPATIENT)
Dept: CARDIAC REHAB | Facility: HOSPITAL | Age: 75
End: 2025-04-01
Payer: MEDICARE

## 2025-04-01 DIAGNOSIS — J44.9 STAGE 4 VERY SEVERE CHRONIC OBSTRUCTIVE PULMONARY DISEASE BY GLOBAL INITIATIVE FOR CHRONIC OBSTRUCTIVE LUNG DISEASE CLASSIFICATION (MULTI): ICD-10-CM

## 2025-04-01 PROCEDURE — 94625 PHY/QHP OP PULM RHB W/O MNTR: CPT | Performed by: INTERNAL MEDICINE

## 2025-04-03 ENCOUNTER — APPOINTMENT (OUTPATIENT)
Dept: CARDIAC REHAB | Facility: HOSPITAL | Age: 75
End: 2025-04-03
Payer: MEDICARE

## 2025-04-03 DIAGNOSIS — J44.9 STAGE 4 VERY SEVERE CHRONIC OBSTRUCTIVE PULMONARY DISEASE BY GLOBAL INITIATIVE FOR CHRONIC OBSTRUCTIVE LUNG DISEASE CLASSIFICATION (MULTI): ICD-10-CM

## 2025-04-03 PROCEDURE — 94625 PHY/QHP OP PULM RHB W/O MNTR: CPT | Performed by: INTERNAL MEDICINE

## 2025-04-03 NOTE — PROGRESS NOTES
Pulmonary Rehabilitation  150 Day Reassessment    Name: Verner L Blankenship  Medical Record Number: [id number]  YOB: 1950  Age: 74 y.o.    Today’s Date: 4/3/2025  Primary Care Physician: Kirsten Jay DO  Referring Physician: Tim Gil MD  Program Location: [facility]  Primary Diagnosis:   1. Stage 4 very severe chronic obstructive pulmonary disease by Global Initiative for Chronic Obstructive Lung Disease classification (Multi)  Referral to Pulmonary Rehabilitation      Onset/Date of Diagnosis: 2010    Session #: 30    Falls Risk: Low  Psychosocial Assessment  Pre PHQ-9: 0  Post PHQ-9:   Sent PH-Q 9 to MD if score > 20: No; score < 20    Stress Management  Pt reported/currently experiencing stress: No  Patient uses stress management skills: Yes   History of: no history of anxiety or depression  Currently seeing a mental health provider: No  Social Support: Yes  Pre CAT score: 14  Post CAT score:   Learning Assessment:  Learning assessment/barriers: None  Preferred learning method: Auditory, Visual, and Reading handout  Barriers: None  Stages of Change:Action    Psychosocial Plan  Goal Status: In Progress   Psychosocial Goals: Decrease CAT score  Maintain PHQ-9 score.  Learn about living with chronic lung disease.  Learn to use stress management techniques.    Psychosocial Interventions/Education: Educate patient on how to live with chronic lung disease.  Question patient on new or existing stress/anxiety issues at least once every 30 days.  150 Day Reassessment: Patient reports no new stress, anxiety or depression at this time.    Oxygen Assessment  SpO2 at rest: 96 % on RA  SpO2 with exertion: 87 % on RA    Oxygen Use  Supplemental O2 prescribed: No,   Hypoxia managed: Yes   Home Pulse Oximeter: Yes     Pre MMRC: 3  Post MMRC:     Oxygen Plan  Goal Status: In Progress  Oxygen Goals: Decrease MMRC score  Learn and use diaphragmatic breathing as needed.  Learn and use pursed lip  breathing as needed.  Titrate supplemental O2 as needed to keep SpO2 at 88% or greater.    History of WANG?: No    Oxygen Education/Interventions: Learning to use pursed lip breathing during exercise to help lessen dyspnea.  SpO2 checks with pulse oximeter at rest and with each modality.  Supplemental O2 as needed.  Titrate supplemental O2 as needed to keep SpO2 at 88% or greater.  150 Day Reassessment: Pt educated on diaphragmatic and pursed lip breathing techniques: Yes  Continuing to reinforce proper breathing techniques.  Spo2 of 87 or > maintained on RA with exercise.    Nutrition Assessment:  Hyperlipidemia: No   Lipids:   Lab Results   Component Value Date    CHOL 119 03/16/2020    HDL 43.3 03/16/2020    LDLF 68 03/16/2020    TRIG 41 03/16/2020   Current Dietary Guidelines:  None  Barriers to dietary change: no    Diabetes Assessment  Lab Results   Component Value Date    HGBA1C 5.8 (H) 06/06/2024   History of Diabetes: No    Weight Management  Start of rehab weight: 118 lbs  Height: 182.9 cm (6')  Weight: 113.4  BMI (Calculated): 15.4    Nutrition Plan  Goal Status: In Progress  Nutrition Goals: Learn about healthy eating habits.  Maintain current weight.    Nutrition Interventions/Education: Body weight checked weekly.  Discuss fat screener results and recommendations.  150 Day Reassessment: Continuing to re-inforce healthy eating habits for pt.  Fatscreener & AHA H/O on fats reviewed and given to patient: Yes  Weight loss of 4.6 lb since start of rehab. Carlito states he is trying to increase his intake.    Exercise Assessment  Home Exercise: No  Mode: NA  Frequency: NA  Duration: NA    6 Minute Walk Assessment   6 MWT distance: 860 ft  FiO2 used during test: RA    Exercise Prescription  Exercise Prescription based on: 6 Minute Walk Test   Frequency:  2 days/week   Mode: Treadmill, Recumbent Cycle, Arm Ergometer, and SciFit   Duration: 20-40 total aerobic minutes   Intensity: RPE 11-13  Target HR:  Rest  +30  MET Level: 2+  Patient wears supplemental O2: No   Modality Workload METs Duration (minutes)   1 Pre-Exercise      2 Scifit Stepper 4.0 3.2 10 :00   3 Recumbent Bike #8 3.0 10 :00   4 Arm Ergometer UBE 4.0 3.6 10 :00   5 Treadmill 1.5/3.5 2.9 10 :00   6 Post-Exercise      Resistance Training: Yes   Home Exercise Prescription given: To be given prior to discharge from program.    Exercise Plan  Goal Status: In Progress  Exercise Goals: By discharge:  Increase exercise MET level by 5-10% each week  Increase total exercise duration to 30-45 minutes  Obtain 150 minutes/week of moderate intensity aerobic exercise  Initiate strength training 2-3 days a week  Initiate flexibility training 2-3 days a week  Establish a home exercise program before discharge    Exercise Interventions/Education: SPO2 goal is to add or titrate supplemnetal O2 as necessary at rest/exercise via nasal cannula or mask to prevent <88% SPO2.  Target goal for duration: 30-45 minutes; Increase MET level 5-10% each week or as tolerated.    Physical exercise restrictions: None  150 Day Reassessment: Changes made based on HR and RPE/D responses to exercise.  Received education each week present.  Endurance of 40 minutes per session maintained.  Education Classes Completed: Preventing Infection, Diabetes, Risk Factors, Using an Inhaler, Breathing Techniques, How to Live with COPD, Anxiety, Stress and Depression, Bronchial Hygeine, Nutrition, Smoking Cessation, and Exercise    Other Core Components/Risk Factor Assessment:  Medication adherence  Current Medications:   Current Outpatient Medications on File Prior to Visit   Medication Sig Dispense Refill    albuterol (Ventolin HFA) 90 mcg/actuation inhaler inhale 2 puffs by mouth every 4 hours as needed for shortness of breath 18 g 11    amLODIPine (Norvasc) 5 mg tablet Take 1 tablet (5 mg) by mouth once daily. 90 tablet 3    budesonide-glycopyr-formoterol (Breztri Aerosphere) 160-9-4.8 mcg/actuation HFA  aerosol inhaler Inhale 2 puffs 2 times a day. 32.1 g 3    ferrous gluconate 236 mg (27 mg iron) tablet Take 1 tablet (27 mg) by mouth every other day.      ipratropium-albuteroL (Duo-Neb) 0.5-2.5 mg/3 mL nebulizer solution Take 3 mL by nebulization every 4 hours if needed for shortness of breath or wheezing. 360 mL 5    magnesium aspart,citrate,oxide 400 mg magnesium capsule Take 1 capsule by mouth once daily.      pantoprazole (ProtoNix) 40 mg EC tablet Take 1 tablet (40 mg) by mouth once daily. 90 tablet 3    tamsulosin (Flomax) 0.4 mg 24 hr capsule take 1 capsule by mouth once daily 90 capsule 3     No current facility-administered medications on file prior to visit.               Medication compliance: Yes   Uses pill box/organizer: Yes    Carries medication list: Yes    Uses Inhalers appropriately: Yes     Blood Pressure Management  History of Hypertension: Yes   Medication Changes: No   Resting BP: 109/75    Heart Failure Management  Hx of Heart Failure: No    Smoking/Tobacco Assessment  Social History     Tobacco Use   Smoking Status Every Day    Current packs/day: 1.00    Average packs/day: 1 pack/day for 60.3 years (60.3 ttl pk-yrs)    Types: Cigarettes    Start date: 1965   Smokeless Tobacco Never     Other Core Component Plan  Goal Status: In Progress  Other Core Component Goals: Learn to manage bronchial hygiene  Resting BP < 160/90.  Use all medications properly and as prescribed.  Complete smoking cessation.    Frequent Cough?: No  Educational Assessment:  Pulmonary Knowledge Test:   Intake: 9/15  Discharge:     Other Core Component Interventions/Education: Ensure patient is compliant with all medications throughout program.  Explain bronchial hygiene techniques.  Pre/post resting BPs at each session.  150 Day Reassessment: Reviewed effects of exercise on BP: Yes  Reviewed knowledge test: Yes  Smoking cessation provided: Yes  BP remains within target goal, compliant with meds. Pt reports no issues at  "this time.  Carlito states that he continues to smoke \"half a pack or a little more\" per day and will continue to try to decrease. Provided additional encouragement and education.    Individual Patient Goals:  Increase strength and endurance  Be able to do more activity with less SOB.    Goal Status: In Progress    Staff Comments:  150 Day Reassessment: Carlito has completed 30 sessions of pulmonary rehab. Endurance of 40 minutes per session maintained and MET levels have increased to a max of 3.6. We will continue to increase resistance as able. Continuing to provide encouragement and education for smoking cessation.    Rehab Staff Signature: Emmanuel Carlos, RRT  "

## 2025-04-08 ENCOUNTER — APPOINTMENT (OUTPATIENT)
Dept: CARDIAC REHAB | Facility: HOSPITAL | Age: 75
End: 2025-04-08
Payer: MEDICARE

## 2025-04-08 DIAGNOSIS — J44.9 STAGE 4 VERY SEVERE CHRONIC OBSTRUCTIVE PULMONARY DISEASE BY GLOBAL INITIATIVE FOR CHRONIC OBSTRUCTIVE LUNG DISEASE CLASSIFICATION (MULTI): ICD-10-CM

## 2025-04-08 PROCEDURE — 94625 PHY/QHP OP PULM RHB W/O MNTR: CPT | Performed by: INTERNAL MEDICINE

## 2025-04-10 ENCOUNTER — APPOINTMENT (OUTPATIENT)
Dept: CARDIAC REHAB | Facility: HOSPITAL | Age: 75
End: 2025-04-10
Payer: MEDICARE

## 2025-04-10 DIAGNOSIS — J44.9 STAGE 4 VERY SEVERE CHRONIC OBSTRUCTIVE PULMONARY DISEASE BY GLOBAL INITIATIVE FOR CHRONIC OBSTRUCTIVE LUNG DISEASE CLASSIFICATION (MULTI): ICD-10-CM

## 2025-04-10 PROCEDURE — 94625 PHY/QHP OP PULM RHB W/O MNTR: CPT | Performed by: INTERNAL MEDICINE

## 2025-04-15 ENCOUNTER — APPOINTMENT (OUTPATIENT)
Dept: CARDIAC REHAB | Facility: HOSPITAL | Age: 75
End: 2025-04-15
Payer: MEDICARE

## 2025-04-15 DIAGNOSIS — J44.9 STAGE 4 VERY SEVERE CHRONIC OBSTRUCTIVE PULMONARY DISEASE BY GLOBAL INITIATIVE FOR CHRONIC OBSTRUCTIVE LUNG DISEASE CLASSIFICATION (MULTI): ICD-10-CM

## 2025-04-15 PROCEDURE — 94625 PHY/QHP OP PULM RHB W/O MNTR: CPT | Performed by: INTERNAL MEDICINE

## 2025-04-16 DIAGNOSIS — J44.9 COPD, MODERATE (MULTI): ICD-10-CM

## 2025-04-16 RX ORDER — ALBUTEROL SULFATE 90 UG/1
2 INHALANT RESPIRATORY (INHALATION) EVERY 4 HOURS PRN
Qty: 18 G | Refills: 11 | Status: SHIPPED | OUTPATIENT
Start: 2025-04-16

## 2025-04-17 ENCOUNTER — APPOINTMENT (OUTPATIENT)
Dept: CARDIAC REHAB | Facility: HOSPITAL | Age: 75
End: 2025-04-17
Payer: MEDICARE

## 2025-04-17 DIAGNOSIS — J44.9 STAGE 4 VERY SEVERE CHRONIC OBSTRUCTIVE PULMONARY DISEASE BY GLOBAL INITIATIVE FOR CHRONIC OBSTRUCTIVE LUNG DISEASE CLASSIFICATION (MULTI): ICD-10-CM

## 2025-04-17 PROCEDURE — 94625 PHY/QHP OP PULM RHB W/O MNTR: CPT | Performed by: INTERNAL MEDICINE

## 2025-04-22 ENCOUNTER — APPOINTMENT (OUTPATIENT)
Dept: CARDIAC REHAB | Facility: HOSPITAL | Age: 75
End: 2025-04-22
Payer: MEDICARE

## 2025-04-22 DIAGNOSIS — J44.9 STAGE 4 VERY SEVERE CHRONIC OBSTRUCTIVE PULMONARY DISEASE BY GLOBAL INITIATIVE FOR CHRONIC OBSTRUCTIVE LUNG DISEASE CLASSIFICATION (MULTI): ICD-10-CM

## 2025-04-22 PROCEDURE — 94625 PHY/QHP OP PULM RHB W/O MNTR: CPT | Performed by: INTERNAL MEDICINE

## 2025-04-22 NOTE — PROGRESS NOTES
Pulmonary Rehabilitation  Discharge Assessment    Name: Verner L Blankenship  Medical Record Number: [id number]  YOB: 1950  Age: 74 y.o.    Today’s Date: 4/22/2025  Primary Care Physician: Kirsten Jay DO  Referring Physician: Tim Gil MD  Program Location: [facility]  Primary Diagnosis:   1. Stage 4 very severe chronic obstructive pulmonary disease by Global Initiative for Chronic Obstructive Lung Disease classification (Multi)  Referral to Pulmonary Rehabilitation      Onset/Date of Diagnosis: 2010    Sessions Completed: 36    Falls Risk: Low  Psychosocial Assessment  Pre PHQ-9: 0  Post PHQ-9: 0  Sent PH-Q 9 to MD if score > 20: No; score < 20    Stress Management  Pt reported/currently experiencing stress: No  Patient uses stress management skills: Yes   History of: no history of anxiety or depression  Currently seeing a mental health provider: No  Social Support: Yes  Pre CAT score: 14  Post CAT score: 24  Learning Assessment:  Learning assessment/barriers: None  Preferred learning method: Auditory, Visual, and Reading handout  Barriers: None  Stages of Change:Action    Psychosocial Plan  Goal Status: Partially Met  Psychosocial Goals: Decrease CAT score  Maintain PHQ-9 score.  Learn about living with chronic lung disease.  Learn to use stress management techniques.    Psychosocial Interventions/Education: Educate patient on how to live with chronic lung disease.  Question patient on new or existing stress/anxiety issues at least once every 30 days.  Discharge Assessment: Patient reports no new stress, anxiety or depression at this time.  PHQ-9 score unchanged.  CAT score worsened from 14 to 24    Oxygen Assessment  SpO2 at rest: 96 % on RA  SpO2 with exertion: 87 % on RA    Oxygen Use  Supplemental O2 prescribed: No,   Hypoxia managed: Yes   Home Pulse Oximeter: Yes     Pre MMRC: 3  Post MMRC: 1    Oxygen Plan  Goal Status: Met  Oxygen Goals: Decrease MMRC score  Learn and  use diaphragmatic breathing as needed.  Learn and use pursed lip breathing as needed.  Titrate supplemental O2 as needed to keep SpO2 at 88% or greater.    History of WANG?: No    Oxygen Education/Interventions: Learning to use pursed lip breathing during exercise to help lessen dyspnea.  SpO2 checks with pulse oximeter at rest and with each modality.  Supplemental O2 as needed.  Titrate supplemental O2 as needed to keep SpO2 at 88% or greater.  Discharge Assessment: Pt educated on diaphragmatic and pursed lip breathing techniques: Yes  Continuing to reinforce proper breathing techniques.  Spo2 of 87 or > maintained on RA with exercise.  MMRC score improved from 3 to 1    Nutrition Assessment:  Hyperlipidemia: No   Lipids:   Lab Results   Component Value Date    CHOL 119 03/16/2020    HDL 43.3 03/16/2020    LDLF 68 03/16/2020    TRIG 41 03/16/2020   Current Dietary Guidelines:  None  Barriers to dietary change: no    Diabetes Assessment  Lab Results   Component Value Date    HGBA1C 5.8 (H) 06/06/2024   History of Diabetes: No    Weight Management  Start of rehab weight: 118 lbs  Height: 182.9 cm (6')  Weight: 112.4  BMI (Calculated): 15.2    Nutrition Plan  Goal Status: Not Met  Nutrition Goals: Learn about healthy eating habits.  Maintain current weight.    Nutrition Interventions/Education: Body weight checked weekly.  Discuss fat screener results and recommendations.  Discharge Assessment: Continuing to re-inforce healthy eating habits for pt.  Fatscreener & AHA H/O on fats reviewed and given to patient: Yes  Weight loss of 5.6 lb since start of rehab. Carlito states he is trying to increase his intake.    Exercise Assessment  Home Exercise: No  Mode: NA  Frequency: NA  Duration: NA    6 Minute Walk Assessment   Pre:  6 MWT distance: 860 ft  FiO2 used during test: RA  Post: 6 MWT distance: 1000 ft  FiO2 used during test: RA    Exercise Prescription  Exercise Prescription based on: 6 Minute Walk Test   Frequency:  2  days/week   Mode: Treadmill, Recumbent Cycle, Arm Ergometer, and SciFit   Duration: 20-40 total aerobic minutes   Intensity: RPE 11-13  Target HR:  Rest +30  MET Level: 2+  Patient wears supplemental O2: No   Modality Workload METs Duration (minutes)   1 Pre-Exercise      2 Scifit Stepper 5.0 4.2 10 :00   3 Recumbent Bike #10 3.1 10 :00   4 Arm Ergometer UBE 4.3 3.8 10 :00     5 Treadmill 1.5/4.0 3.0 10 :00   6 Post-Exercise      Resistance Training: Yes   Home Exercise Prescription given: Instructed on free weight exercises with 2 lb weights.  Patient demonstrated proper technique and had no questions or concerns.  Home exercise program with stretching, balance exercises and weight exercises given.    Exercise Plan  Goal Status: Partially Met  Exercise Goals: By discharge:  Increase exercise MET level by 5-10% each week  Increase total exercise duration to 30-45 minutes  Obtain 150 minutes/week of moderate intensity aerobic exercise  Initiate strength training 2-3 days a week  Initiate flexibility training 2-3 days a week  Establish a home exercise program before discharge    Exercise Interventions/Education: SPO2 goal is to add or titrate supplemnetal O2 as necessary at rest/exercise via nasal cannula or mask to prevent <88% SPO2.  Target goal for duration: 30-45 minutes; Increase MET level 5-10% each week or as tolerated.    Physical exercise restrictions: None  Discharge Assessment: Changes made based on HR and RPE/D responses to exercise.  Received education each week present.  Endurance of 40 minutes per session maintained.  Education Classes Completed: Preventing Infection, Diabetes, Risk Factors, Using an Inhaler, Breathing Techniques, How to Live with COPD, Anxiety, Stress and Depression, Bronchial Hygeine, Nutrition, Smoking Cessation, and Exercise  Six minute walk test distance increased by 16%.    Other Core Components/Risk Factor Assessment:  Medication adherence  Current Medications:   Current  Outpatient Medications on File Prior to Visit   Medication Sig Dispense Refill    albuterol 90 mcg/actuation inhaler INHALE 2 PUFFS BY MOUTH EVERY 4 HOURS AS NEEDED FOR SHORTNESS OF BREATH 18 g 11    amLODIPine (Norvasc) 5 mg tablet Take 1 tablet (5 mg) by mouth once daily. 90 tablet 3    budesonide-glycopyr-formoterol (Breztri Aerosphere) 160-9-4.8 mcg/actuation HFA aerosol inhaler Inhale 2 puffs 2 times a day. 32.1 g 3    ferrous gluconate 236 mg (27 mg iron) tablet Take 1 tablet (27 mg) by mouth every other day.      ipratropium-albuteroL (Duo-Neb) 0.5-2.5 mg/3 mL nebulizer solution Take 3 mL by nebulization every 4 hours if needed for shortness of breath or wheezing. 360 mL 5    magnesium aspart,citrate,oxide 400 mg magnesium capsule Take 1 capsule by mouth once daily.      pantoprazole (ProtoNix) 40 mg EC tablet Take 1 tablet (40 mg) by mouth once daily. 90 tablet 3    tamsulosin (Flomax) 0.4 mg 24 hr capsule take 1 capsule by mouth once daily 90 capsule 3    [DISCONTINUED] albuterol (Ventolin HFA) 90 mcg/actuation inhaler inhale 2 puffs by mouth every 4 hours as needed for shortness of breath 18 g 11     No current facility-administered medications on file prior to visit.               Medication compliance: Yes   Uses pill box/organizer: Yes    Carries medication list: Yes    Uses Inhalers appropriately: Yes     Blood Pressure Management  History of Hypertension: Yes   Medication Changes: No   Resting BP: 126/72    Heart Failure Management  Hx of Heart Failure: No    Smoking/Tobacco Assessment  Social History     Tobacco Use   Smoking Status Every Day    Current packs/day: 1.00    Average packs/day: 1 pack/day for 60.3 years (60.3 ttl pk-yrs)    Types: Cigarettes    Start date: 1965   Smokeless Tobacco Never     Other Core Component Plan  Goal Status: Partially Met  Other Core Component Goals: Learn to manage bronchial hygiene  Resting BP < 160/90.  Use all medications properly and as prescribed.  Complete  "smoking cessation.    Frequent Cough?: No  Educational Assessment:  Pulmonary Knowledge Test:   Intake: 9/15  Discharge: 12/15    Other Core Component Interventions/Education: Ensure patient is compliant with all medications throughout program.  Explain bronchial hygiene techniques.  Pre/post resting BPs at each session.  Discharge Assessment: Reviewed effects of exercise on BP: Yes  Reviewed knowledge test: Yes  Smoking cessation provided: Yes  BP remains within target goal, compliant with meds. Pt reports no issues at this time.  Carlito states that he continues to smoke \"about half a pack\" per day and will continue to try to decrease. Provided additional encouragement and education.  Knowledge test score improved by 20%    Individual Patient Goals:  Increase strength and endurance  Be able to do more activity with less SOB.    Goal Status: Partially Met    Staff Comments:  Discharge Assessment: Carlito has completed all 36 sessions of pulmonary rehab and did very well in the program. He was able to increase endurance to 40 minutes per session and maintain that level. MET levels were increased to a max of 4.2. Carlito reports a noted increase in strength and endurance outside of rehab and plans to continue exercise at a local [facility] program. Outcomes; MMRC score improved from 3 to 1, PHQ-9 unchanged at 0, CAT score worsened from 14 to 24, weight 5.6 lbs, Knowledge test score improved by 20% and 6MWT distance increased by 16%.     Rehab Staff Signature: Emmanuel Carlos, RRT  "

## 2025-06-11 PROCEDURE — RXMED WILLOW AMBULATORY MEDICATION CHARGE

## 2025-06-13 ENCOUNTER — PHARMACY VISIT (OUTPATIENT)
Dept: PHARMACY | Facility: CLINIC | Age: 75
End: 2025-06-13
Payer: MEDICARE

## 2025-06-27 DIAGNOSIS — J44.9 STAGE 4 VERY SEVERE CHRONIC OBSTRUCTIVE PULMONARY DISEASE BY GLOBAL INITIATIVE FOR CHRONIC OBSTRUCTIVE LUNG DISEASE CLASSIFICATION (MULTI): ICD-10-CM

## 2025-06-30 RX ORDER — IPRATROPIUM BROMIDE AND ALBUTEROL SULFATE 2.5; .5 MG/3ML; MG/3ML
3 SOLUTION RESPIRATORY (INHALATION) EVERY 4 HOURS PRN
Qty: 360 ML | Refills: 11 | Status: SHIPPED | OUTPATIENT
Start: 2025-06-30

## 2025-07-10 LAB
ALBUMIN SERPL-MCNC: 4.1 G/DL (ref 3.6–5.1)
ALP SERPL-CCNC: 82 U/L (ref 35–144)
ALT SERPL-CCNC: 10 U/L (ref 9–46)
ANION GAP SERPL CALCULATED.4IONS-SCNC: 11 MMOL/L (CALC) (ref 7–17)
AST SERPL-CCNC: 17 U/L (ref 10–35)
BILIRUB SERPL-MCNC: 0.4 MG/DL (ref 0.2–1.2)
BUN SERPL-MCNC: 15 MG/DL (ref 7–25)
CALCIUM SERPL-MCNC: 9.4 MG/DL (ref 8.6–10.3)
CHLORIDE SERPL-SCNC: 97 MMOL/L (ref 98–110)
CO2 SERPL-SCNC: 28 MMOL/L (ref 20–32)
CREAT SERPL-MCNC: 0.75 MG/DL (ref 0.7–1.28)
EGFRCR SERPLBLD CKD-EPI 2021: 94 ML/MIN/1.73M2
ERYTHROCYTE [DISTWIDTH] IN BLOOD BY AUTOMATED COUNT: 12.6 % (ref 11–15)
EST. AVERAGE GLUCOSE BLD GHB EST-MCNC: 114 MG/DL
EST. AVERAGE GLUCOSE BLD GHB EST-SCNC: 6.3 MMOL/L
FOLATE SERPL-MCNC: 7.9 NG/ML
GLUCOSE SERPL-MCNC: 88 MG/DL (ref 65–99)
HBA1C MFR BLD: 5.6 %
HCT VFR BLD AUTO: 40.1 % (ref 38.5–50)
HCV AB SERPL QL IA: NORMAL
HGB BLD-MCNC: 13.2 G/DL (ref 13.2–17.1)
IRON SERPL-MCNC: 46 MCG/DL (ref 50–180)
MAGNESIUM SERPL-MCNC: 2 MG/DL (ref 1.5–2.5)
MCH RBC QN AUTO: 31.6 PG (ref 27–33)
MCHC RBC AUTO-ENTMCNC: 32.9 G/DL (ref 32–36)
MCV RBC AUTO: 95.9 FL (ref 80–100)
PLATELET # BLD AUTO: 404 THOUSAND/UL (ref 140–400)
PMV BLD REES-ECKER: 9.1 FL (ref 7.5–12.5)
POTASSIUM SERPL-SCNC: 3.9 MMOL/L (ref 3.5–5.3)
PROT SERPL-MCNC: 6.8 G/DL (ref 6.1–8.1)
RBC # BLD AUTO: 4.18 MILLION/UL (ref 4.2–5.8)
SODIUM SERPL-SCNC: 136 MMOL/L (ref 135–146)
WBC # BLD AUTO: 11 THOUSAND/UL (ref 3.8–10.8)

## 2025-07-14 NOTE — PATIENT INSTRUCTIONS
Thank you for choosing [facility] for your healthcare.   As always if you have any questions or concerns please do not hesitate to call our office at [phone] or through [employer].    Have a great day!  Kirsten Jay, DO

## 2025-07-15 ENCOUNTER — APPOINTMENT (OUTPATIENT)
Dept: PRIMARY CARE | Facility: CLINIC | Age: 75
End: 2025-07-15
Payer: MEDICARE

## 2025-07-15 VITALS
TEMPERATURE: 96.9 F | DIASTOLIC BLOOD PRESSURE: 70 MMHG | HEIGHT: 70 IN | RESPIRATION RATE: 20 BRPM | SYSTOLIC BLOOD PRESSURE: 108 MMHG | WEIGHT: 101.9 LBS | OXYGEN SATURATION: 94 % | BODY MASS INDEX: 14.59 KG/M2 | HEART RATE: 95 BPM

## 2025-07-15 DIAGNOSIS — Z00.00 ROUTINE GENERAL MEDICAL EXAMINATION AT HEALTH CARE FACILITY: Primary | ICD-10-CM

## 2025-07-15 DIAGNOSIS — E44.0 MODERATE PROTEIN-CALORIE MALNUTRITION (MULTI): Chronic | ICD-10-CM

## 2025-07-15 DIAGNOSIS — N40.0 HYPERTROPHY OF PROSTATE: ICD-10-CM

## 2025-07-15 PROCEDURE — 1126F AMNT PAIN NOTED NONE PRSNT: CPT | Performed by: FAMILY MEDICINE

## 2025-07-15 PROCEDURE — G2211 COMPLEX E/M VISIT ADD ON: HCPCS | Performed by: FAMILY MEDICINE

## 2025-07-15 PROCEDURE — 1170F FXNL STATUS ASSESSED: CPT | Performed by: FAMILY MEDICINE

## 2025-07-15 PROCEDURE — 3078F DIAST BP <80 MM HG: CPT | Performed by: FAMILY MEDICINE

## 2025-07-15 PROCEDURE — G0439 PPPS, SUBSEQ VISIT: HCPCS | Performed by: FAMILY MEDICINE

## 2025-07-15 PROCEDURE — 1159F MED LIST DOCD IN RCRD: CPT | Performed by: FAMILY MEDICINE

## 2025-07-15 PROCEDURE — 3074F SYST BP LT 130 MM HG: CPT | Performed by: FAMILY MEDICINE

## 2025-07-15 PROCEDURE — 1160F RVW MEDS BY RX/DR IN RCRD: CPT | Performed by: FAMILY MEDICINE

## 2025-07-15 PROCEDURE — 4004F PT TOBACCO SCREEN RCVD TLK: CPT | Performed by: FAMILY MEDICINE

## 2025-07-15 RX ORDER — CYPROHEPTADINE HYDROCHLORIDE 4 MG/1
4 TABLET ORAL NIGHTLY
Qty: 90 TABLET | Refills: 3 | Status: SHIPPED | OUTPATIENT
Start: 2025-07-15 | End: 2026-07-10

## 2025-07-15 RX ORDER — TAMSULOSIN HYDROCHLORIDE 0.4 MG/1
0.4 CAPSULE ORAL DAILY
Qty: 90 CAPSULE | Refills: 3 | Status: SHIPPED | OUTPATIENT
Start: 2025-07-15

## 2025-07-15 SDOH — ECONOMIC STABILITY: FOOD INSECURITY: WITHIN THE PAST 12 MONTHS, YOU WORRIED THAT YOUR FOOD WOULD RUN OUT BEFORE YOU GOT MONEY TO BUY MORE.: NEVER TRUE

## 2025-07-15 SDOH — ECONOMIC STABILITY: FOOD INSECURITY: WITHIN THE PAST 12 MONTHS, THE FOOD YOU BOUGHT JUST DIDN'T LAST AND YOU DIDN'T HAVE MONEY TO GET MORE.: NEVER TRUE

## 2025-07-15 ASSESSMENT — LIFESTYLE VARIABLES
AUDIT-C TOTAL SCORE: 3
HOW OFTEN DO YOU HAVE SIX OR MORE DRINKS ON ONE OCCASION: NEVER
SKIP TO QUESTIONS 9-10: 1
HOW OFTEN DO YOU HAVE A DRINK CONTAINING ALCOHOL: 2-3 TIMES A WEEK
HOW MANY STANDARD DRINKS CONTAINING ALCOHOL DO YOU HAVE ON A TYPICAL DAY: 1 OR 2

## 2025-07-15 ASSESSMENT — ACTIVITIES OF DAILY LIVING (ADL)
DRESSING: INDEPENDENT
DOING_HOUSEWORK: INDEPENDENT
MANAGING_FINANCES: INDEPENDENT
GROCERY_SHOPPING: INDEPENDENT
BATHING: INDEPENDENT
TAKING_MEDICATION: INDEPENDENT

## 2025-07-15 ASSESSMENT — ENCOUNTER SYMPTOMS
DEPRESSION: 0
LOSS OF SENSATION IN FEET: 0
OCCASIONAL FEELINGS OF UNSTEADINESS: 0

## 2025-07-15 ASSESSMENT — PATIENT HEALTH QUESTIONNAIRE - PHQ9
2. FEELING DOWN, DEPRESSED OR HOPELESS: NOT AT ALL
SUM OF ALL RESPONSES TO PHQ9 QUESTIONS 1 & 2: 0
1. LITTLE INTEREST OR PLEASURE IN DOING THINGS: NOT AT ALL

## 2025-07-15 ASSESSMENT — PAIN SCALES - GENERAL: PAINLEVEL_OUTOF10: 0-NO PAIN

## 2025-07-15 NOTE — PROGRESS NOTES
"Subjective   Reason for Visit: Verner L Blankenship is an 75 y.o. male here for a Medicare Wellness visit.     Past Medical, Surgical, and Family History reviewed and updated in chart.    Reviewed all medications by prescribing practitioner or clinical pharmacist (such as prescriptions, OTCs, herbal therapies and supplements) and documented in the medical record.    He had lab work done and is here for review.         Patient Care Team:  Kirsten Jay DO as PCP - General  Kirsten Jay DO as PCP - [employer] PCP  SHAUNA Barfield-CNP as Nurse Practitioner (Pulmonary Disease)         Objective   Vitals:  /70 (BP Location: Right arm, Patient Position: Sitting, BP Cuff Size: Small child)   Pulse 95   Temp 36.1 °C (96.9 °F) (Temporal)   Resp 20   Ht 1.778 m (5' 10\")   Wt 46.2 kg (101 lb 14.4 oz)   SpO2 94%   BMI 14.62 kg/m²       Physical Exam  Constitutional: cachexia, well developed, appears stated age  Eyes: no scleral icterus, no conjunctival injection  Neck: no thyromegaly  Cardiovascular: regular rate and rhythm, no leg edema  Respiratory: normal respiratory effort, clear to auscultation bilaterally  Musculoskeletal: increased thoracic kyphosis   Skin: Warm, dry. No rashes  Neurologic: Alert, CNs II-XII grossly intact..  Psych: Appropriate mood and affect.    Assessment & Plan  Routine general medical examination at health care facility  Colonoscopy done 2023  CT lung CA screening done 2/2025       Hypertrophy of prostate    Orders:    tamsulosin (Flomax) 0.4 mg 24 hr capsule; Take 1 capsule (0.4 mg) by mouth once daily.    Moderate protein-calorie malnutrition (Multi)  Weight is down 16 lbs in the past 6 months   Due to poor appetite/early satiety  Rx cyprohepatadine to see if that helps    Orders:    cyproheptadine (Periactin) 4 mg tablet; Take 1 tablet (4 mg) by mouth once daily at bedtime. To help increase your appetite       Labs reviewed with patient.   Follow up 5 " months.   Kirsten Jay, DO

## 2025-07-15 NOTE — ASSESSMENT & PLAN NOTE
Weight is down 16 lbs in the past 6 months   Due to poor appetite/early satiety  Rx cyprohepatadine to see if that helps    Orders:    cyproheptadine (Periactin) 4 mg tablet; Take 1 tablet (4 mg) by mouth once daily at bedtime. To help increase your appetite

## 2025-08-12 ENCOUNTER — TELEPHONE (OUTPATIENT)
Dept: PRIMARY CARE | Facility: CLINIC | Age: 75
End: 2025-08-12
Payer: MEDICARE

## 2025-08-25 ENCOUNTER — HOSPITAL ENCOUNTER (OUTPATIENT)
Dept: RADIOLOGY | Facility: CLINIC | Age: 75
Discharge: HOME | End: 2025-08-25
Payer: MEDICARE

## 2025-08-25 DIAGNOSIS — F17.210 CIGARETTE NICOTINE DEPENDENCE WITHOUT COMPLICATION: ICD-10-CM

## 2025-08-25 PROCEDURE — 71250 CT THORAX DX C-: CPT | Performed by: RADIOLOGY

## 2025-08-25 PROCEDURE — 71250 CT THORAX DX C-: CPT

## 2025-08-26 ENCOUNTER — RESULTS FOLLOW-UP (OUTPATIENT)
Dept: PULMONOLOGY | Facility: HOSPITAL | Age: 75
End: 2025-08-26

## 2025-08-26 ENCOUNTER — APPOINTMENT (OUTPATIENT)
Dept: PHARMACY | Facility: HOSPITAL | Age: 75
End: 2025-08-26
Payer: MEDICARE

## 2025-08-26 DIAGNOSIS — J44.9 STAGE 4 VERY SEVERE CHRONIC OBSTRUCTIVE PULMONARY DISEASE BY GLOBAL INITIATIVE FOR CHRONIC OBSTRUCTIVE LUNG DISEASE CLASSIFICATION (MULTI): ICD-10-CM

## 2025-08-26 DIAGNOSIS — J44.9 COPD, MODERATE (MULTI): Primary | ICD-10-CM

## 2025-08-26 DIAGNOSIS — J44.9 COPD, MODERATE (MULTI): Chronic | ICD-10-CM

## 2025-08-26 PROCEDURE — RXMED WILLOW AMBULATORY MEDICATION CHARGE

## 2025-08-26 RX ORDER — CEFDINIR 300 MG/1
300 CAPSULE ORAL 2 TIMES DAILY
Qty: 14 CAPSULE | Refills: 0 | Status: SHIPPED | OUTPATIENT
Start: 2025-08-26 | End: 2025-09-02

## 2025-08-26 RX ORDER — BUDESONIDE, GLYCOPYRROLATE, AND FORMOTEROL FUMARATE 160; 9; 4.8 UG/1; UG/1; UG/1
2 AEROSOL, METERED RESPIRATORY (INHALATION) 2 TIMES DAILY
Qty: 32.1 G | Refills: 3 | Status: SHIPPED | OUTPATIENT
Start: 2025-08-26

## 2025-08-26 RX ORDER — PREDNISONE 10 MG/1
TABLET ORAL
Qty: 40 TABLET | Refills: 0 | Status: SHIPPED | OUTPATIENT
Start: 2025-08-26 | End: 2025-09-11

## 2025-08-26 RX ORDER — AZITHROMYCIN 250 MG/1
TABLET, FILM COATED ORAL
Qty: 6 TABLET | Refills: 0 | Status: SHIPPED | OUTPATIENT
Start: 2025-08-26 | End: 2025-08-31

## 2025-08-27 ENCOUNTER — PHARMACY VISIT (OUTPATIENT)
Dept: PHARMACY | Facility: CLINIC | Age: 75
End: 2025-08-27
Payer: MEDICARE

## 2025-09-16 ENCOUNTER — APPOINTMENT (OUTPATIENT)
Dept: PULMONOLOGY | Facility: HOSPITAL | Age: 75
End: 2025-09-16
Payer: MEDICARE

## 2025-12-22 ENCOUNTER — APPOINTMENT (OUTPATIENT)
Dept: PRIMARY CARE | Facility: CLINIC | Age: 75
End: 2025-12-22
Payer: MEDICARE

## 2026-08-18 ENCOUNTER — APPOINTMENT (OUTPATIENT)
Dept: PHARMACY | Facility: HOSPITAL | Age: 76
End: 2026-08-18
Payer: MEDICARE